# Patient Record
Sex: MALE | Race: BLACK OR AFRICAN AMERICAN | NOT HISPANIC OR LATINO | Employment: UNEMPLOYED | URBAN - METROPOLITAN AREA
[De-identification: names, ages, dates, MRNs, and addresses within clinical notes are randomized per-mention and may not be internally consistent; named-entity substitution may affect disease eponyms.]

---

## 2024-04-13 ENCOUNTER — APPOINTMENT (OUTPATIENT)
Dept: RADIOLOGY | Facility: HOSPITAL | Age: 34
End: 2024-04-13
Payer: COMMERCIAL

## 2024-04-13 ENCOUNTER — HOSPITAL ENCOUNTER (EMERGENCY)
Facility: HOSPITAL | Age: 34
Discharge: HOME | End: 2024-04-13
Payer: COMMERCIAL

## 2024-04-13 VITALS
HEART RATE: 96 BPM | RESPIRATION RATE: 18 BRPM | DIASTOLIC BLOOD PRESSURE: 68 MMHG | TEMPERATURE: 98.4 F | SYSTOLIC BLOOD PRESSURE: 113 MMHG | HEIGHT: 71 IN | WEIGHT: 137 LBS | BODY MASS INDEX: 19.18 KG/M2 | OXYGEN SATURATION: 99 %

## 2024-04-13 DIAGNOSIS — S49.92XA INJURY OF LEFT SHOULDER, INITIAL ENCOUNTER: Primary | ICD-10-CM

## 2024-04-13 PROCEDURE — 73030 X-RAY EXAM OF SHOULDER: CPT | Mod: LT

## 2024-04-13 PROCEDURE — 73030 X-RAY EXAM OF SHOULDER: CPT | Mod: LEFT SIDE | Performed by: RADIOLOGY

## 2024-04-13 PROCEDURE — 2500000004 HC RX 250 GENERAL PHARMACY W/ HCPCS (ALT 636 FOR OP/ED): Performed by: NURSE PRACTITIONER

## 2024-04-13 PROCEDURE — 99283 EMERGENCY DEPT VISIT LOW MDM: CPT

## 2024-04-13 PROCEDURE — 96372 THER/PROPH/DIAG INJ SC/IM: CPT | Performed by: NURSE PRACTITIONER

## 2024-04-13 RX ORDER — NAPROXEN 500 MG/1
500 TABLET ORAL
Qty: 20 TABLET | Refills: 0 | Status: SHIPPED | OUTPATIENT
Start: 2024-04-13 | End: 2024-04-23

## 2024-04-13 RX ORDER — TIZANIDINE 2 MG/1
4 TABLET ORAL 3 TIMES DAILY PRN
Qty: 180 TABLET | Refills: 0 | Status: SHIPPED | OUTPATIENT
Start: 2024-04-13 | End: 2024-09-12 | Stop reason: WASHOUT

## 2024-04-13 RX ORDER — HYDROMORPHONE HYDROCHLORIDE 1 MG/ML
0.6 INJECTION, SOLUTION INTRAMUSCULAR; INTRAVENOUS; SUBCUTANEOUS ONCE
Status: COMPLETED | OUTPATIENT
Start: 2024-04-13 | End: 2024-04-13

## 2024-04-13 RX ADMIN — HYDROMORPHONE HYDROCHLORIDE 0.6 MG: 1 INJECTION, SOLUTION INTRAMUSCULAR; INTRAVENOUS; SUBCUTANEOUS at 16:56

## 2024-04-13 ASSESSMENT — PAIN - FUNCTIONAL ASSESSMENT: PAIN_FUNCTIONAL_ASSESSMENT: 0-10

## 2024-04-13 ASSESSMENT — PAIN DESCRIPTION - LOCATION: LOCATION: SHOULDER

## 2024-04-13 ASSESSMENT — COLUMBIA-SUICIDE SEVERITY RATING SCALE - C-SSRS
6. HAVE YOU EVER DONE ANYTHING, STARTED TO DO ANYTHING, OR PREPARED TO DO ANYTHING TO END YOUR LIFE?: NO
1. IN THE PAST MONTH, HAVE YOU WISHED YOU WERE DEAD OR WISHED YOU COULD GO TO SLEEP AND NOT WAKE UP?: NO
2. HAVE YOU ACTUALLY HAD ANY THOUGHTS OF KILLING YOURSELF?: NO

## 2024-04-13 ASSESSMENT — PAIN SCALES - GENERAL: PAINLEVEL_OUTOF10: 10 - WORST POSSIBLE PAIN

## 2024-04-13 ASSESSMENT — PAIN DESCRIPTION - DESCRIPTORS: DESCRIPTORS: ACHING

## 2024-04-13 ASSESSMENT — PAIN DESCRIPTION - ORIENTATION: ORIENTATION: LEFT

## 2024-04-13 NOTE — Clinical Note
Geovanny Kirk was seen and treated in our emergency department on 4/13/2024.  He may return to work on 04/17/2024.       If you have any questions or concerns, please don't hesitate to call.      Edson Taylor, TAYLOR-CNP

## 2024-04-13 NOTE — ED PROVIDER NOTES
HPI   Chief Complaint   Patient presents with    Shoulder Pain       33-year-old male was moving furniture for his child and felt something pop in his left shoulder.  It occurred at 9 AM this morning.  The last time he ate was pancakes at 7 AM.  He denies any prior history of dislocating his shoulder.  He denies any allergies to medication.  He is not on any medication.  He took ibuprofen for the pain.  He rates the pain 10 out of 10.  He denies posterior neck pain.  He denies chest pain or dyspnea.  He denies abdominal pain, nausea, or vomiting.  He denies paresthesia of left upper extremity but endorses limited range of motion and he cannot move his left upper extremity upward more than 10 degrees without severe pain.      History provided by:  Patient and parent   used: No                        [place] Coma Scale Score: 15                     Patient History   No past medical history on file.  No past surgical history on file.  No family history on file.  Social History     Tobacco Use    Smoking status: Every Day     Types: Cigarettes    Smokeless tobacco: Not on file   Substance Use Topics    Alcohol use: Never    Drug use: Not on file       Physical Exam   ED Triage Vitals [04/13/24 1613]   Temperature Heart Rate Respirations BP   36.9 °C (98.4 °F) 96 18 113/68      Pulse Ox Temp src Heart Rate Source Patient Position   99 % -- -- --      BP Location FiO2 (%)     Right arm --       Physical Exam  Constitutional:       Appearance: Normal appearance.   HENT:      Head: Normocephalic and atraumatic.      Nose: Nose normal.      Mouth/Throat:      Mouth: Mucous membranes are moist.   Cardiovascular:      Rate and Rhythm: Normal rate and regular rhythm.   Pulmonary:      Effort: Pulmonary effort is normal.   Abdominal:      General: Abdomen is flat.      Palpations: Abdomen is soft.   Musculoskeletal:         General: Tenderness present.      Cervical back: Normal range of motion.       Comments: Limited range of motion of left upper extremity.  Radial pulse 2/2 and cap refill less than 2 seconds   Skin:     General: Skin is warm.      Capillary Refill: Capillary refill takes less than 2 seconds.   Neurological:      General: No focal deficit present.      Mental Status: He is alert and oriented to person, place, and time.   Psychiatric:         Mood and Affect: Mood normal.         Behavior: Behavior normal.         ED Course & MDM   Diagnoses as of 04/13/24 1808   Injury of left shoulder, initial encounter       Medical Decision Making  Patient responded well to 0.6 mg of Dilaudid and was no longer crying.  A sling was applied for comfort.  X-ray was negative for acute findings.  He may have a rotator cuff injury.  I requested an appointment with orthopedic surgery.  I gave him a note for work until Wednesday.  He received contact information.  He can use Naprosyn and tizanidine for continued pain management and ice would also be helpful.  Cap refill was less than 2 seconds and radial pulse was 2/2.  Safely discharged home with careful return precautions.    Amount and/or Complexity of Data Reviewed  Radiology: ordered and independent interpretation performed.        Procedure  Procedures     Edson Taylor, TAYLOR-CNP  04/13/24 1808

## 2024-04-13 NOTE — ED TRIAGE NOTES
Patient presented to ed for left shoulder pain, patient was moving things in home where he felt a pop. 10/10 for pain.

## 2024-04-17 ENCOUNTER — OFFICE VISIT (OUTPATIENT)
Dept: ORTHOPEDIC SURGERY | Facility: HOSPITAL | Age: 34
End: 2024-04-17
Payer: COMMERCIAL

## 2024-04-17 DIAGNOSIS — M25.812 IMPINGEMENT OF LEFT SHOULDER: ICD-10-CM

## 2024-04-17 PROCEDURE — 2500000004 HC RX 250 GENERAL PHARMACY W/ HCPCS (ALT 636 FOR OP/ED): Performed by: ORTHOPAEDIC SURGERY

## 2024-04-17 PROCEDURE — 99204 OFFICE O/P NEW MOD 45 MIN: CPT | Performed by: ORTHOPAEDIC SURGERY

## 2024-04-17 PROCEDURE — 2500000005 HC RX 250 GENERAL PHARMACY W/O HCPCS: Performed by: ORTHOPAEDIC SURGERY

## 2024-04-17 PROCEDURE — 20610 DRAIN/INJ JOINT/BURSA W/O US: CPT | Performed by: ORTHOPAEDIC SURGERY

## 2024-04-17 PROCEDURE — 99214 OFFICE O/P EST MOD 30 MIN: CPT | Performed by: ORTHOPAEDIC SURGERY

## 2024-04-17 RX ORDER — LIDOCAINE HYDROCHLORIDE 10 MG/ML
4 INJECTION INFILTRATION; PERINEURAL
Status: COMPLETED | OUTPATIENT
Start: 2024-04-17 | End: 2024-04-17

## 2024-04-17 RX ORDER — TRIAMCINOLONE ACETONIDE 40 MG/ML
40 INJECTION, SUSPENSION INTRA-ARTICULAR; INTRAMUSCULAR
Status: COMPLETED | OUTPATIENT
Start: 2024-04-17 | End: 2024-04-17

## 2024-04-17 RX ADMIN — TRIAMCINOLONE ACETONIDE 40 MG: 400 INJECTION, SUSPENSION INTRA-ARTICULAR; INTRAMUSCULAR at 14:28

## 2024-04-17 RX ADMIN — LIDOCAINE HYDROCHLORIDE 4 ML: 10 INJECTION, SOLUTION INFILTRATION; PERINEURAL at 14:28

## 2024-04-17 NOTE — PROGRESS NOTES
Evaluation of left shoulder pain has been going on for several days.  He was seen in the emergency room and given a sling and some medication which did not help.  He had a prodrome of mild pain got much worse after heavy usage.  Pain is into his shoulder and arm and into his neck and the left side.  Sling was not helpful.    The patient is pleasant and cooperative.  The patient is alert and oriented ×3.  Auditory function is intact.  The patient is a fair historian.  The patient is not in acute distress.  Eye exam significant for nonicteric sclera, intact ocular muscle movement.  Breathing is rhythmic symmetric and nonlabored.    Left radial pulse palpable brisk cap refill light touch sensation intact integument significant for tattoos otherwise intact with no lesions lacerations abrasions or contusions the distal clavicle is prominent but nontender.  There is no tenderness about the shoulder but there is pain on elevation above 90 degrees.  Patient to maintain abduction against slight resistance grade 4/5 abduction strength internal/external rotation strength 5/5 there is a click on external rotation with the arm at the side.  No apprehension.  Patient has good range of motion of the cervical spine with minimal pain.    X-rays were obtained and reviewed with the patient the glenohumeral and subacromial spaces are well-preserved no fractures occasional subluxation arthritic degenerative changes noted.    Left shoulder pain    Patient is left shoulder pain most likely from subacromial origin we discussed options for treatment I recommended subacromial injection.  Injection was performed and tolerated well by the patient.  I have also recommended physical therapy and discontinuance of sling.  Prescription for therapy was provided today.  Follow-up will be by telephone next week.    This was dictated using voice recognition software and not corrected for grammatical or spelling errors.  L Inj/Asp: L subacromial bursa  on 4/17/2024 2:28 PM  Indications: pain  Details: 22 G needle, posterior approach  Medications: 40 mg triamcinolone acetonide 40 mg/mL; 4 mL lidocaine 10 mg/mL (1 %)  Outcome: tolerated well, no immediate complications  Procedure, treatment alternatives, risks and benefits explained, specific risks discussed. Consent was given by the patient. Immediately prior to procedure a time out was called to verify the correct patient, procedure, equipment, support staff and site/side marked as required.

## 2024-05-13 ENCOUNTER — OFFICE VISIT (OUTPATIENT)
Dept: ORTHOPEDIC SURGERY | Facility: HOSPITAL | Age: 34
End: 2024-05-13
Payer: COMMERCIAL

## 2024-05-13 ENCOUNTER — APPOINTMENT (OUTPATIENT)
Dept: PHYSICAL THERAPY | Facility: CLINIC | Age: 34
End: 2024-05-13

## 2024-05-13 DIAGNOSIS — M75.102 TEAR OF LEFT ROTATOR CUFF, UNSPECIFIED TEAR EXTENT, UNSPECIFIED WHETHER TRAUMATIC: ICD-10-CM

## 2024-05-13 PROCEDURE — 99212 OFFICE O/P EST SF 10 MIN: CPT | Performed by: ORTHOPAEDIC SURGERY

## 2024-05-13 RX ORDER — MELOXICAM 15 MG/1
15 TABLET ORAL DAILY
Qty: 10 TABLET | Refills: 0 | Status: SHIPPED | OUTPATIENT
Start: 2024-05-13 | End: 2024-05-23

## 2024-05-13 NOTE — PROGRESS NOTES
Mr. Kim is here for reevaluation of his shoulder he had some relief from the injection but it was only transient.  He now has recurrent pain over the deltoid worse with elevation and extension.  He has stopped working because of it.    Left shoulder integument intact with exception of tattoos no asymmetry no atrophy.  The patient has full range of motion but painful arc of motion external rotation and abduction is particularly painful.  He does not have apprehension.  Motor power in abduction is 5/5.    Left shoulder pain    Patient is left shoulder pain recalcitrant to injection and activity modification.  We discussed options for further evaluation I recommend the patient have an MRI and follow-up after it has been done.  This was dictated using voice recognition software and not corrected for grammatical or spelling errors.

## 2024-05-29 ENCOUNTER — HOSPITAL ENCOUNTER (OUTPATIENT)
Dept: RADIOLOGY | Facility: HOSPITAL | Age: 34
Discharge: HOME | End: 2024-05-29
Payer: COMMERCIAL

## 2024-05-29 DIAGNOSIS — M75.102 TEAR OF LEFT ROTATOR CUFF, UNSPECIFIED TEAR EXTENT, UNSPECIFIED WHETHER TRAUMATIC: ICD-10-CM

## 2024-05-29 PROCEDURE — 73221 MRI JOINT UPR EXTREM W/O DYE: CPT | Mod: LT

## 2024-06-02 ENCOUNTER — HOSPITAL ENCOUNTER (EMERGENCY)
Facility: HOSPITAL | Age: 34
Discharge: HOME | End: 2024-06-02

## 2024-06-02 ENCOUNTER — APPOINTMENT (OUTPATIENT)
Dept: CARDIOLOGY | Facility: HOSPITAL | Age: 34
End: 2024-06-02

## 2024-06-02 VITALS
HEART RATE: 75 BPM | HEIGHT: 71 IN | TEMPERATURE: 98 F | DIASTOLIC BLOOD PRESSURE: 84 MMHG | BODY MASS INDEX: 19.18 KG/M2 | WEIGHT: 137 LBS | SYSTOLIC BLOOD PRESSURE: 105 MMHG | OXYGEN SATURATION: 99 % | RESPIRATION RATE: 20 BRPM

## 2024-06-02 DIAGNOSIS — G89.29 CHRONIC LEFT SHOULDER PAIN: Primary | ICD-10-CM

## 2024-06-02 DIAGNOSIS — M25.512 CHRONIC LEFT SHOULDER PAIN: Primary | ICD-10-CM

## 2024-06-02 PROCEDURE — 2500000005 HC RX 250 GENERAL PHARMACY W/O HCPCS

## 2024-06-02 PROCEDURE — 2500000001 HC RX 250 WO HCPCS SELF ADMINISTERED DRUGS (ALT 637 FOR MEDICARE OP)

## 2024-06-02 PROCEDURE — 93005 ELECTROCARDIOGRAM TRACING: CPT

## 2024-06-02 PROCEDURE — 96372 THER/PROPH/DIAG INJ SC/IM: CPT

## 2024-06-02 PROCEDURE — 2500000004 HC RX 250 GENERAL PHARMACY W/ HCPCS (ALT 636 FOR OP/ED)

## 2024-06-02 PROCEDURE — 99283 EMERGENCY DEPT VISIT LOW MDM: CPT

## 2024-06-02 RX ORDER — OXYCODONE AND ACETAMINOPHEN 5; 325 MG/1; MG/1
1 TABLET ORAL ONCE
Status: COMPLETED | OUTPATIENT
Start: 2024-06-02 | End: 2024-06-02

## 2024-06-02 RX ORDER — OXYCODONE AND ACETAMINOPHEN 5; 325 MG/1; MG/1
1 TABLET ORAL EVERY 8 HOURS PRN
Qty: 9 TABLET | Refills: 0 | Status: SHIPPED | OUTPATIENT
Start: 2024-06-02 | End: 2024-06-05

## 2024-06-02 RX ORDER — ORPHENADRINE CITRATE 30 MG/ML
60 INJECTION INTRAMUSCULAR; INTRAVENOUS ONCE
Status: COMPLETED | OUTPATIENT
Start: 2024-06-02 | End: 2024-06-02

## 2024-06-02 RX ORDER — LIDOCAINE 560 MG/1
2 PATCH PERCUTANEOUS; TOPICAL; TRANSDERMAL ONCE
Status: DISCONTINUED | OUTPATIENT
Start: 2024-06-02 | End: 2024-06-02 | Stop reason: HOSPADM

## 2024-06-02 RX ORDER — KETOROLAC TROMETHAMINE 30 MG/ML
30 INJECTION, SOLUTION INTRAMUSCULAR; INTRAVENOUS ONCE
Status: COMPLETED | OUTPATIENT
Start: 2024-06-02 | End: 2024-06-02

## 2024-06-02 RX ORDER — METHOCARBAMOL 500 MG/1
500 TABLET, FILM COATED ORAL 3 TIMES DAILY
Qty: 21 TABLET | Refills: 0 | Status: SHIPPED | OUTPATIENT
Start: 2024-06-02 | End: 2024-09-12 | Stop reason: WASHOUT

## 2024-06-02 RX ADMIN — OXYCODONE HYDROCHLORIDE AND ACETAMINOPHEN 1 TABLET: 5; 325 TABLET ORAL at 09:17

## 2024-06-02 RX ADMIN — ORPHENADRINE CITRATE 60 MG: 60 INJECTION INTRAMUSCULAR; INTRAVENOUS at 09:19

## 2024-06-02 RX ADMIN — LIDOCAINE 4% 2 PATCH: 40 PATCH TOPICAL at 09:18

## 2024-06-02 RX ADMIN — KETOROLAC TROMETHAMINE 30 MG: 30 INJECTION, SOLUTION INTRAMUSCULAR at 09:20

## 2024-06-02 ASSESSMENT — PAIN DESCRIPTION - ORIENTATION
ORIENTATION: LEFT
ORIENTATION: LEFT

## 2024-06-02 ASSESSMENT — COLUMBIA-SUICIDE SEVERITY RATING SCALE - C-SSRS
1. IN THE PAST MONTH, HAVE YOU WISHED YOU WERE DEAD OR WISHED YOU COULD GO TO SLEEP AND NOT WAKE UP?: NO
2. HAVE YOU ACTUALLY HAD ANY THOUGHTS OF KILLING YOURSELF?: NO
6. HAVE YOU EVER DONE ANYTHING, STARTED TO DO ANYTHING, OR PREPARED TO DO ANYTHING TO END YOUR LIFE?: NO

## 2024-06-02 ASSESSMENT — PAIN - FUNCTIONAL ASSESSMENT
PAIN_FUNCTIONAL_ASSESSMENT: 0-10

## 2024-06-02 ASSESSMENT — PAIN DESCRIPTION - PAIN TYPE
TYPE: ACUTE PAIN
TYPE: ACUTE PAIN

## 2024-06-02 ASSESSMENT — PAIN SCALES - GENERAL
PAINLEVEL_OUTOF10: 10 - WORST POSSIBLE PAIN
PAINLEVEL_OUTOF10: 10 - WORST POSSIBLE PAIN
PAINLEVEL_OUTOF10: 6

## 2024-06-02 ASSESSMENT — PAIN DESCRIPTION - LOCATION
LOCATION: SHOULDER
LOCATION: SHOULDER

## 2024-06-02 NOTE — ED TRIAGE NOTES
Patient presents to ED via car with c/o left shoulder pain for past few months after injuring it in April. Has had an MRI for same.     Patient alert and oriented x 4, skin warm, pink, dry, in NAD, resp. easy unlabored.   '

## 2024-06-02 NOTE — ED PROVIDER NOTES
HPI   Chief Complaint   Patient presents with    Shoulder Pain       HPI  HPI: This is 33 y.o. male who presents to the ER complaining of left shoulder pain ongoing for the past 3 months.  Patient states that he previously injured the shoulder, states he had 2 separate injuries in which he was moving objects and felt a pop and pain in the shoulder, and has had persistent pain in the shoulder since.  Pain is located over the superior shoulder into the trapezius, is worse with any range of motion of the shoulder, lifting objects.  Much worse with heavy use of the extremity.  Not worsened with exertion such as walking distances or climbing stairs.  Pain does not radiate into the chest, back, or down the left upper extremity.  He denies any swelling, redness, or warmth of the shoulder or the bilateral upper extremities.  No decreased range of motion of the upper extremities, though has pain with range of motion of the shoulder.  He denies fevers or chills.  No chest pain or shortness of breath, no pleuritic pain.  No cough or hemoptysis.  Patient has been seen for this by orthopedics, he underwent a steroid injection in the shoulder which provided relief for period of time, the pain subsequently returned after a few weeks.  Saw orthopedics again on 5/13, had an MRI done, results as below, shows narrowing capsular edema about the AC joint suggestive of a low-grade AC sprain in the setting of a prior injury, and possible existing distal clavicular osteolysis.  He was prescribed meloxicam and tizanidine.  Has not yet followed up with orthopedics after the MRI.  Does have an appointment scheduled for Wednesday, 3 days from now.  He states that the injury has caused significant difficulty with work, he works as a  and states that he has had to stop work due to the pain.  States that he also has an infant and caring for the infant is difficult due to the pain.  He states the pain is not relieved with meloxicam and  Flexeril.  Is not simply worsened today, states that he is just not getting any relief, is concerned the MRI may have missed a tear, and worried that he may need surgery.  Denies abdominal pain, nausea, or vomiting.  Denies neck pain or stiffness, no back pain.  No headache, no blurry vision.  No dizziness or lightheadedness, no syncope.  No urinary symptoms.  No numbness, tingling, or weakness to the extremities.  No medications taken today for his symptoms.  No other complaints or symptoms voiced.     Left shoulder MRI from 5/29:   IMPRESSION:  Prominent marrow and capsular edema about the acromioclavicular joint suggestive of low-grade AC sprain in the setting of prior injury. Presence of subchondral cystic changes and indistinctness of the subchondral bone plate may indicate pre-existing distal clavicular osteolysis.    ROS:  General: No decreased responsiveness, no fever, chills  Neuro: no numbness or tingling  ENMT: No nosebleed  Eyes: No discharge or redness  Skel: + left shoulder pain, no neck or back pain  Cardiac: No chest pain   Resp: No shortness of breath  GI: No abdominal pain  Skin: no rash or wounds, no erythema, edema or ecchymosis  Heme: no bleeding or petechiae    PMH: ADHD  Social History: no smoker, no EtOH, no drugs  Family History: Noncontributory    Physical Exam:  General: Vital signs stable, Pt is alert, no acute distress  Eyes: Conjunctiva normal, EOMs intact  HENMT: Normocephalic, atraumatic.  Moist mucous membranes.   Resp: Respiratory effort is normal, no retractions, no stridor.   CV: Heart is regular rate and rhythm.   Skin: Multiple tattoos. No evidence of trauma, skin is warm and dry. No rashes, lesions or ulcers.  Skel: full range of motion of upper and lower extremities. No midline tenderness over the cervical thoracic or lumbar spine.  LUE: +tenderness over the superior and lateral shoulder, into the trapezius and deltoid.  Distal clavicle prominent but nontender with no  overlying skin changes, no edema, no evidence of septic joint.  No edema, erythema, or warmth over the shoulder or remainder of the LUE.  Has intact full active range of motion of the shoulders equal bilaterally, but pain with forward flexion, abduction, and external rotation of the left shoulder. 5/5 strength bilaterally with shoulder forward flexion and abduction, elbow flexion and extension, wrist flexion and extension, and finger abduction.  There is no tenderness over the chest wall, cervical or thoracic paraspinal musculature, or midline spine.  No tenderness or edema over the the remainder of the bilateral upper extremities, nontender over the left upper arm, biceps, elbow, forearm, wrist, hand, or fingers. Able to flex and extend all digits without pain. Able to make a fist and wiggle fingers. No wounds, no bleeding or ecchymosis. No erythema or warmth. Neurovascularly intact, cap refill < 2 sec, 2+radial pulses, warm well perfused, sensation intact and equal throughout the BUE.   Neuro: Normal gait, no motor or sensory changes.  Psych: Alert and oriented ×3, judgment is appropriate, normal mood and affect         [place] Coma Scale Score: 15                     Patient History   No past medical history on file.  No past surgical history on file.  No family history on file.  Social History     Tobacco Use    Smoking status: Every Day     Types: Cigarettes    Smokeless tobacco: Not on file   Substance Use Topics    Alcohol use: Never    Drug use: Not on file       Physical Exam   ED Triage Vitals [06/02/24 0859]   Temperature Heart Rate Respirations BP   36.7 °C (98 °F) 81 20 (!) 150/92      Pulse Ox Temp Source Heart Rate Source Patient Position   100 % Oral -- --      BP Location FiO2 (%)     -- --       Physical Exam    ED Course & [facility]   ED Course as of 06/02/24 1101   Sun Jun 02, 2024   5260 09:07 12 lead EKG interpreted by myself and my ED attending reveals sinus rhythm with sinus arrhythmia with a rate  of 66 beats per minute.  IN interval 160ms, QRS duration 90ms, Qtc 369ms. Early repolarization present, no reciprocal ST depressions. T wave inversions in V1. No acute ischemic changes identified.  [EH]      ED Course User Index  [EH] Eli Ham PA-C         Diagnoses as of 06/02/24 1101   Chronic left shoulder pain       Medical Decision Making  ED course / MDM     Summary:  Patient presented with left shoulder pain ongoing for multiple months after injury.  Followed by orthopedics, recently had an MRI which showed marrow and capsular edema over the AC joint likely representing a low-grade AC sprain in the setting of prior injury, and possible existing distal clavicular osteolysis.  Presenting to the ED due to uncontrolled pain.  Not relieved with meloxicam or tizanidine.  Has had to stop working due to the pain.  He has no other symptoms or complaints.  Vital signs are stable, mildly hypertensive in triage at 150/92, vital signs otherwise normal, nontoxic-appearing.  Patient is overall very well-appearing, ambulates unassisted, no acute distress.  Frustrated on initial evaluation due to lack of relief of pain.  On exam, there is some tenderness over the superior and lateral shoulder, into the deltoid and trapezius, there is no edema, no overlying skin changes, no erythema or warmth, has full range of motion of all joints of the BUE though some pain with range of motion of the shoulder, no evidence of tendon or ligament rupture.  The extremity is neurovascular intact.  Physical exam is reassuring, and he has no other associated symptoms, no fevers or chills, no evidence of cellulitis or septic joint on evaluation.  EKG nonischemic, shows some early repolarization.  Has no exertional symptoms, no chest pain, no dyspnea, no other cardiac signs or symptoms, and no history of cardiovascular disease, low suspicion for a intrathoracic cause of his pain such as ACS or MI.  Pain is the same as he has been experiencing  for a prolonged period of time, patient agrees with plan to treat his pain in the ED and reevaluate.  Patient given a dose of Toradol, Norflex, Percocet, and lidocaine patches in the ED.  On reevaluation, pain significantly improved.  Patient feels much better.  Discussed management options in detail with the patient.  We discussed further workup including labs or imaging, which the patient agrees are not indicated at this time.  I have low suspicion for any referred pain, infection, fracture or bony abnormality, I do agree further workup with labs or imaging is not indicated at this time.  Patient is looking for pain relief at home until he gets to his orthopedics appointment 3 days from now.  He is in agreement the plan for discharge with pain control and close orthopedics and PCP follow-up.  Prescription sent for lidocaine patches, Robaxin, and a short course of Percocet.  I expressed the importance of outpatient follow-up with orthopedics soon as possible, as well as his PCP.  He does also have physical therapy appointments scheduled in the near future.  Repeat vitals improved, BP normalized to 105/84.  Patient is extremely well-appearing, no evidence of infection, systemic illness, or organ dysfunction.  Stable for discharge with close outpatient PCP and orthopedics follow-up. Patient was given strict return precautions, understands reasons to return to the ED. Also discussed supportive care instructions. I expressed the importance of outpatient follow up with their PCP. All questions were answered, patient expressed understanding and stated that they would comply.    Impression:  1. See diagnosis    Plan: Homegoing. I discussed the differential, results, and discharge plan with the patient and family/friend/caregiver. Patient was advised to follow up with PCP or recommended provider in 2-3 days for another evaluation and exam. I emphasized the importance of follow-up with the physician I referred them to in  the timeframe recommended.  I explained reasons for the patient to return to the Emergency Department. They agreed that if they feel their condition is worsening,  if symptoms change, get worse, new symptoms develop prior to follow up, or if they have any other concern they should call 911 immediately for further assistance. If there is no improvement in symptoms in the next 24 hours they are advised to return for further evaluation and exam. I also explained the plan and treatment course. We also discussed medications that were prescribed including common side effects and interactions. The patient was advised to abstain from driving, operating heavy machinery, or making significant decisions while taking medications such as opiates and muscle relaxers that may impair this. I gave the patient an opportunity to ask all questions they had and answered all of them accordingly. They understand return precautions and discharge instructions. Patient and family/friend/caregiver/guardian is in agreement with plan, treatment course, and follow up and states verbally that they will comply.     Disposition: Discharge    This note has been transcribed using voice recognition and may contain grammatical errors, misplaced words, incorrect words, incorrect phrases or other errors.   Procedure  Procedures     Eli Ham PA-C  06/02/24 1104

## 2024-06-03 LAB
ATRIAL RATE: 66 BPM
P AXIS: 72 DEGREES
P OFFSET: 191 MS
P ONSET: 139 MS
PR INTERVAL: 160 MS
Q ONSET: 219 MS
QRS COUNT: 11 BEATS
QRS DURATION: 90 MS
QT INTERVAL: 352 MS
QTC CALCULATION(BAZETT): 369 MS
QTC FREDERICIA: 363 MS
R AXIS: 66 DEGREES
T AXIS: 59 DEGREES
T OFFSET: 395 MS
VENTRICULAR RATE: 66 BPM

## 2024-06-04 ENCOUNTER — APPOINTMENT (OUTPATIENT)
Dept: PRIMARY CARE | Facility: CLINIC | Age: 34
End: 2024-06-04
Payer: COMMERCIAL

## 2024-06-05 ENCOUNTER — OFFICE VISIT (OUTPATIENT)
Dept: ORTHOPEDIC SURGERY | Facility: HOSPITAL | Age: 34
End: 2024-06-05
Payer: MEDICAID

## 2024-06-05 DIAGNOSIS — M89.512 OSTEOLYSIS OF ACROMIAL END OF LEFT CLAVICLE: Primary | ICD-10-CM

## 2024-06-05 PROCEDURE — L3670 SO ACRO/CLAV CAN WEB PRE OTS: HCPCS | Performed by: ORTHOPAEDIC SURGERY

## 2024-06-05 PROCEDURE — 99213 OFFICE O/P EST LOW 20 MIN: CPT | Performed by: ORTHOPAEDIC SURGERY

## 2024-06-05 NOTE — PROGRESS NOTES
Patient is here for reevaluation of his left shoulder and review of his MRI.    Patient has pain on palpation of the acromioclavicular joint pain on cross body motion and pain at the extremes of range of motion with circumduction.  No apprehension.  Full motor power in abduction internal/external rotation.    MRI scan shows distal clavicle osteolysis.  Rotator cuff is intact glenohumeral articular cartilage intact.    Distal clavicle osteolysis left shoulder    Patient is failed a trial of none surgical treatment and I have recommended surgery    We discussed options for treatment of this and I have recommended the patient consider surgery the procedure of the left shoulder distal clavicle resection.  n.  The patient is interested in pursuing this sometime in the near future.  We discussed the potential rehabilitation sequence possible risks alternatives and the patient will schedule surgery at his convenience.    This was dictated using voice recognition software and not corrected for grammatical or spelling errors.

## 2024-06-06 ENCOUNTER — PREP FOR PROCEDURE (OUTPATIENT)
Dept: ORTHOPEDIC SURGERY | Facility: HOSPITAL | Age: 34
End: 2024-06-06
Payer: MEDICAID

## 2024-06-06 DIAGNOSIS — M89.512 OSTEOLYSIS OF ACROMIAL END OF LEFT CLAVICLE: ICD-10-CM

## 2024-06-11 ENCOUNTER — APPOINTMENT (OUTPATIENT)
Dept: PHYSICAL THERAPY | Facility: CLINIC | Age: 34
End: 2024-06-11

## 2024-06-11 ENCOUNTER — CLINICAL SUPPORT (OUTPATIENT)
Dept: PREADMISSION TESTING | Facility: HOSPITAL | Age: 34
End: 2024-06-11
Payer: COMMERCIAL

## 2024-06-11 DIAGNOSIS — M89.512 OSTEOLYSIS OF ACROMIAL END OF LEFT CLAVICLE: ICD-10-CM

## 2024-06-11 RX ORDER — DEXTROMETHORPHAN HYDROBROMIDE, GUAIFENESIN 5; 100 MG/5ML; MG/5ML
650 LIQUID ORAL EVERY 8 HOURS PRN
COMMUNITY

## 2024-06-18 ENCOUNTER — LAB (OUTPATIENT)
Dept: LAB | Facility: LAB | Age: 34
End: 2024-06-18
Payer: COMMERCIAL

## 2024-06-18 ENCOUNTER — PRE-ADMISSION TESTING (OUTPATIENT)
Dept: PREADMISSION TESTING | Facility: HOSPITAL | Age: 34
End: 2024-06-18
Payer: COMMERCIAL

## 2024-06-18 VITALS
WEIGHT: 131.61 LBS | SYSTOLIC BLOOD PRESSURE: 110 MMHG | HEIGHT: 69 IN | DIASTOLIC BLOOD PRESSURE: 67 MMHG | HEART RATE: 69 BPM | TEMPERATURE: 98.8 F | RESPIRATION RATE: 18 BRPM | OXYGEN SATURATION: 99 % | BODY MASS INDEX: 19.49 KG/M2

## 2024-06-18 DIAGNOSIS — Z01.818 PREPROCEDURAL EXAMINATION: ICD-10-CM

## 2024-06-18 DIAGNOSIS — Z01.818 PREPROCEDURAL EXAMINATION: Primary | ICD-10-CM

## 2024-06-18 LAB
ANION GAP SERPL CALC-SCNC: 11 MMOL/L (ref 10–20)
BASOPHILS # BLD AUTO: 0.06 X10*3/UL (ref 0–0.1)
BASOPHILS NFR BLD AUTO: 0.7 %
BUN SERPL-MCNC: 10 MG/DL (ref 6–23)
CALCIUM SERPL-MCNC: 9.5 MG/DL (ref 8.6–10.3)
CHLORIDE SERPL-SCNC: 101 MMOL/L (ref 98–107)
CO2 SERPL-SCNC: 30 MMOL/L (ref 21–32)
CREAT SERPL-MCNC: 0.79 MG/DL (ref 0.5–1.3)
EGFRCR SERPLBLD CKD-EPI 2021: >90 ML/MIN/1.73M*2
EOSINOPHIL # BLD AUTO: 0.23 X10*3/UL (ref 0–0.7)
EOSINOPHIL NFR BLD AUTO: 2.5 %
ERYTHROCYTE [DISTWIDTH] IN BLOOD BY AUTOMATED COUNT: 15.1 % (ref 11.5–14.5)
GLUCOSE SERPL-MCNC: 93 MG/DL (ref 74–99)
HCT VFR BLD AUTO: 42.6 % (ref 41–52)
HGB BLD-MCNC: 14.1 G/DL (ref 13.5–17.5)
IMM GRANULOCYTES # BLD AUTO: 0.02 X10*3/UL (ref 0–0.7)
IMM GRANULOCYTES NFR BLD AUTO: 0.2 % (ref 0–0.9)
LYMPHOCYTES # BLD AUTO: 2.4 X10*3/UL (ref 1.2–4.8)
LYMPHOCYTES NFR BLD AUTO: 26.1 %
MCH RBC QN AUTO: 28.8 PG (ref 26–34)
MCHC RBC AUTO-ENTMCNC: 33.1 G/DL (ref 32–36)
MCV RBC AUTO: 87 FL (ref 80–100)
MONOCYTES # BLD AUTO: 0.59 X10*3/UL (ref 0.1–1)
MONOCYTES NFR BLD AUTO: 6.4 %
NEUTROPHILS # BLD AUTO: 5.89 X10*3/UL (ref 1.2–7.7)
NEUTROPHILS NFR BLD AUTO: 64.1 %
NRBC BLD-RTO: 0 /100 WBCS (ref 0–0)
PLATELET # BLD AUTO: 541 X10*3/UL (ref 150–450)
POTASSIUM SERPL-SCNC: 4.4 MMOL/L (ref 3.5–5.3)
RBC # BLD AUTO: 4.9 X10*6/UL (ref 4.5–5.9)
SODIUM SERPL-SCNC: 138 MMOL/L (ref 136–145)
WBC # BLD AUTO: 9.2 X10*3/UL (ref 4.4–11.3)

## 2024-06-18 PROCEDURE — 80048 BASIC METABOLIC PNL TOTAL CA: CPT

## 2024-06-18 PROCEDURE — 85025 COMPLETE CBC W/AUTO DIFF WBC: CPT

## 2024-06-18 ASSESSMENT — ENCOUNTER SYMPTOMS
ARTHRALGIAS: 1
RESPIRATORY NEGATIVE: 1
NECK PAIN: 1
CARDIOVASCULAR NEGATIVE: 1

## 2024-06-18 NOTE — PREPROCEDURE INSTRUCTIONS
Medication List            Accurate as of June 18, 2024  2:57 PM. Always use your most recent med list.                acetaminophen 650 mg ER tablet  Commonly known as: Tylenol 8 HOUR  Notes to patient: Take if needed      methocarbamol 500 mg tablet  Commonly known as: Robaxin  Take 1 tablet (500 mg) by mouth 3 times a day for 7 days. As needed for pain  Medication Adjustments for Surgery: Continue until night before surgery     tiZANidine 2 mg tablet  Commonly known as: Zanaflex  Take 2 tablets (4 mg) by mouth 3 times a day as needed for muscle spasms.  Medication Adjustments for Surgery: Continue until night before surgery                 CONTACT SURGEON'S OFFICE IF YOU DEVELOP:  * Fever = 100.4 F   * New respiratory symptoms (e.g. cough, shortness of breath, respiratory distress, sore throat)  * Recent loss of taste or smell  *Flu like symptoms such as headache, fatigue or gastrointestinal symptoms  * You develop any open sores, shingles, burning or painful urination   AND/OR:  * You no longer wish to have the surgery.  * Any other personal circumstances change that may lead to the need to cancel or defer this surgery.  *You were admitted to any hospital within one week of your planned procedure.    SMOKING:  *Quitting smoking can make a huge difference to your health and recovery from surgery.    *If you need help with quitting, call [phone]-QUIT-NOW.    THE DAY BEFORE SURGERY:  *Do not eat any food after midnight the night before surgery.   to 13.5 ounces of clear liquid until TWO hours before your instructed arrival time to the hospital  DIABETICS:  Please check fasting blood sugar  upon waking up.  If fasting sugar is <80 mg/dl, please drink 100ml/3oz of apple juice no later than 2 hours prior to surgery.      SURGICAL TIME  *You will be contacted between 2 p.m. and 6 p.m. the business day before your surgery with your arrival time.  *If you haven't received a call by 6pm, call [phone].  *Scheduled  surgery times may change and you will be notified if this occurs-check your personal voicemail for any updates.    ON THE MORNING OF SURGERY:  *Wear comfortable, loose fitting clothing.   *Do not use moisturizers, creams, lotions or perfume.  *All jewelry and valuables should be left at home.  *Prosthetic devices such as contact lenses, hearing aids, dentures, eyelash extensions, hairpins and body piercing must be removed before surgery.    BRING WITH YOU:  *Photo ID and insurance card  *Current list of medicines and allergies  *Pacemaker/Defibrillator/Heart stent cards  *CPAP machine and mask  *Slings/splints/crutches  *Copy of your complete Advanced Directive/DHPOA-if applicable  *Neurostimulator implant remote    PARKING AND ARRIVAL:  *Check in at the Main Entrance desk and let them know you are here for surgery.  *You will be directed to the 2nd floor surgical waiting area.    AFTER OUTPATIENT SURGERY:  *A responsible adult MUST accompany you at the time of discharge and stay with you for 24 hours after your surgery.  *You may NOT drive yourself home after surgery.  *You may use a taxi or ride sharing service ([employer], [employer]) to return home ONLY if you are accompanied by a friend or family member.  *Instructions for resuming your medications will be provided by your surgeon.      .

## 2024-06-18 NOTE — CPM/PAT H&P
"CPM/PAT Evaluation       Name: Geovanny Bradley (Geovanny Bradley \"Jamar\")  /Age: 1990/33 y.o.     SURGEON :DR FUENTES  Surgery, Date, and Length:  Left Shoulder Open Distal Clavicle Resection 24  HPI:  This a 33y.o. male who presents for presurgical evaluation for for above mentioned procedure   . Pt has pain in left shoulder for 3 months . He was moving objects and felt a pop and had immediate pain . He presented to the ER. Imaging showed distal clavicular osteolysis .After discussion of the risks and benefits with Dr. Timmons the patient elects to proceed with the planned procedure.       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 access or unexpected hospital admissions.  NO malignant hyperthermia and or pseudo cholinesterase deficiency.    The patient is not  a Rastafari and will accept blood and blood products if medically indicated.   No history of blood transfusions .Type and screen not sent.     Social History  Social History     Substance and Sexual Activity   Drug Use Yes    Types: Marijuana    Comment: gummies daily to help with pain    PT INSTRUCTED TO HOLD GUMMIES X 7 DAYS   Social History     Substance and Sexual Activity   Alcohol Use Never      Social History     Tobacco Use   Smoking Status Every Day    Current packs/day: 0.25    Types: Cigarettes   Smokeless Tobacco Current   Tobacco Comments    5 cigarettes a day          Family History   Problem Relation Name Age of Onset    No Known Problems Mother      No Known Problems Father      No Known Problems Brother         No Known " "Allergies    Prior to Admission medications    Medication Sig Start Date End Date Taking? Authorizing Provider   acetaminophen (Tylenol 8 HOUR) 650 mg ER tablet Take 1 tablet (650 mg) by mouth every 8 hours if needed for mild pain (1 - 3). Do not crush, chew, or split.    Historical Provider, MD   methocarbamol (Robaxin) 500 mg tablet Take 1 tablet (500 mg) by mouth 3 times a day for 7 days. As needed for pain 6/2/24 6/9/24  Eli Ham PA-C   tiZANidine (Zanaflex) 2 mg tablet Take 2 tablets (4 mg) by mouth 3 times a day as needed for muscle spasms. 4/13/24 5/13/24  Edson Taylor, APRN-CNP        PAT ROS:   Constitutional:   Neuro/Psych:   Eyes:   Ears:   Nose:   Mouth:   neg    Throat:   neg    Neck:    neck pain  Cardio:   neg    Respiratory:   neg    Endocrine:   GI:   :   neg    Musculoskeletal:    arthralgias (b/l shoulders)  Hematologic:   neg    Skin:      Physical Exam  Constitutional:       Appearance: Normal appearance.   HENT:      Head: Normocephalic.      Mouth/Throat:      Mouth: Mucous membranes are moist.   Eyes:      Pupils: Pupils are equal, round, and reactive to light.   Cardiovascular:      Rate and Rhythm: Normal rate and regular rhythm.      Pulses: Normal pulses.      Heart sounds: Normal heart sounds.   Pulmonary:      Breath sounds: Normal breath sounds.   Musculoskeletal:         General: Tenderness present.      Cervical back: Normal range of motion.   Neurological:      Mental Status: He is alert and oriented to person, place, and time.   Psychiatric:         Behavior: Behavior normal.          PAT AIRWAY:   Airway:     Mallampati::  II  normal      /67   Pulse 69   Temp 37.1 °C (98.8 °F)   Resp 18   Ht 1.755 m (5' 9.09\")   Wt 59.7 kg (131 lb 9.8 oz)   SpO2 99%   BMI 19.38 kg/m²       Lab Results   Component Value Date    WBC 9.2 06/18/2024    HGB 14.1 06/18/2024    HCT 42.6 06/18/2024    MCV 87 06/18/2024     (H) 06/18/2024     Lab Results   Component Value " Date    GLUCOSE 93 06/18/2024    CALCIUM 9.5 06/18/2024     06/18/2024    K 4.4 06/18/2024    CO2 30 06/18/2024     06/18/2024    BUN 10 06/18/2024    CREATININE 0.79 06/18/2024       ASSESSMENT/PLAN    Patient is a 33year-old  scheduled for Left Shoulder Open Distal Clavicle Resection  with Dr. TIMMONS  on  7/2/24 .  CARDIOVASCULAR:  RCRI score / Risk: The patients score is 0 based on history . Per ACC/AHA guidelines this places him  at  3.9% risk for MACE undergoing a low  risk procedure . The patient has the following risk factors: denies   Functional Capacity: The patients exercise tolerance is  4  METS. This is based on the patient's. Patient denies  active cardiac symptoms or anginal equivalents .      PULMONARY:  The patient has the following factors that place them at increased risk of perioperative pulmonary complications;active smoker /greater than 1 hour procedure.  Postoperatively the patient would benefit from early pulmonary toilet/incentive spirometry q 1-2 hours while awake/pulse oximetry/cautious use of respiratory depressant medications such as opioids/elevate the HOB/oral hygiene.    THROMBOCYTOSIS :  The patient’s platelet count came back at  541.    Upon trending this is higher than the patient’s platelet range.  The patient denied any history of abnormal skin bruising, bleeding, or clotting, as well as redness and tingling of the hands and feet to suggest primary thrombocytosis.  Pt has had rising platelets for several years. Pt does not see a PCP . Dr Timmons informed of increase risk of clots and referral to hematology if requested .    DVT:  CAPRINI SCORE=6  The patient has the following factors that increase his  Risk for thrombus formation ; Virchow's triad , smoker , thrombocytosis, Surgical procedure >1 hrs  procedure .    Recommendations: DVT prophylaxis  per Dr. Timmons  protocol . SCD's, JENNIFER's, and early ambulation are recommended. Heparin or LMWH is recommended for  the very high risk .      Risk assessment complete.  Patient is scheduled for  LOW  surgical risk procedure.  Patient is considered an acceptable  risk to proceed with the planned procedure.      Preoperative medication instructions were provided and reviewed with the patient.  Any additional testing or evaluation was explained to the patient.  Nothing by mouth instructions were discussed and patient's questions were answered prior to conclusion to this encounter.  Patient verbalized understanding of preoperative instructions given in preadmission testing; discharge instructions available in EMR.

## 2024-06-20 ENCOUNTER — TELEPHONE (OUTPATIENT)
Dept: ORTHOPEDIC SURGERY | Facility: HOSPITAL | Age: 34
End: 2024-06-20
Payer: COMMERCIAL

## 2024-06-20 DIAGNOSIS — R79.89 HIGH PLATELET COUNT: ICD-10-CM

## 2024-06-20 DIAGNOSIS — Z76.89 ENCOUNTER TO ESTABLISH CARE: ICD-10-CM

## 2024-06-20 NOTE — TELEPHONE ENCOUNTER
Message received from Dr Timmons that Geovanny's labs came back from PAT abnormal: Platelets 541, rising the last several years.  Patient has need to establish with PCP and hematology.      I called patient, he is aware of PAT lab findings.  Referrals entered for Primary Care and Hematology.  Patient will call and schedule appointments.

## 2024-07-02 ENCOUNTER — HOSPITAL ENCOUNTER (OUTPATIENT)
Facility: HOSPITAL | Age: 34
Setting detail: OUTPATIENT SURGERY
Discharge: HOME | End: 2024-07-02
Attending: ORTHOPAEDIC SURGERY | Admitting: ORTHOPAEDIC SURGERY
Payer: COMMERCIAL

## 2024-07-02 ENCOUNTER — ANESTHESIA EVENT (OUTPATIENT)
Dept: OPERATING ROOM | Facility: HOSPITAL | Age: 34
End: 2024-07-02
Payer: COMMERCIAL

## 2024-07-02 ENCOUNTER — ANESTHESIA (OUTPATIENT)
Dept: OPERATING ROOM | Facility: HOSPITAL | Age: 34
End: 2024-07-02
Payer: COMMERCIAL

## 2024-07-02 VITALS
HEIGHT: 69 IN | SYSTOLIC BLOOD PRESSURE: 111 MMHG | BODY MASS INDEX: 19.49 KG/M2 | DIASTOLIC BLOOD PRESSURE: 67 MMHG | TEMPERATURE: 97 F | WEIGHT: 131.61 LBS | HEART RATE: 72 BPM | OXYGEN SATURATION: 97 % | RESPIRATION RATE: 14 BRPM

## 2024-07-02 DIAGNOSIS — M89.512 OSTEOLYSIS OF ACROMIAL END OF LEFT CLAVICLE: Primary | ICD-10-CM

## 2024-07-02 PROCEDURE — 2720000007 HC OR 272 NO HCPCS: Performed by: ORTHOPAEDIC SURGERY

## 2024-07-02 PROCEDURE — 2500000004 HC RX 250 GENERAL PHARMACY W/ HCPCS (ALT 636 FOR OP/ED): Performed by: ANESTHESIOLOGY

## 2024-07-02 PROCEDURE — 2500000004 HC RX 250 GENERAL PHARMACY W/ HCPCS (ALT 636 FOR OP/ED): Performed by: ANESTHESIOLOGIST ASSISTANT

## 2024-07-02 PROCEDURE — 2500000005 HC RX 250 GENERAL PHARMACY W/O HCPCS: Performed by: ANESTHESIOLOGY

## 2024-07-02 PROCEDURE — 3700000002 HC GENERAL ANESTHESIA TIME - EACH INCREMENTAL 1 MINUTE: Performed by: ORTHOPAEDIC SURGERY

## 2024-07-02 PROCEDURE — 3700000001 HC GENERAL ANESTHESIA TIME - INITIAL BASE CHARGE: Performed by: ORTHOPAEDIC SURGERY

## 2024-07-02 PROCEDURE — 7100000009 HC PHASE TWO TIME - INITIAL BASE CHARGE: Performed by: ORTHOPAEDIC SURGERY

## 2024-07-02 PROCEDURE — 23120 CLAVICULECTOMY PARTIAL: CPT | Performed by: PHYSICIAN ASSISTANT

## 2024-07-02 PROCEDURE — 7100000010 HC PHASE TWO TIME - EACH INCREMENTAL 1 MINUTE: Performed by: ORTHOPAEDIC SURGERY

## 2024-07-02 PROCEDURE — 2500000005 HC RX 250 GENERAL PHARMACY W/O HCPCS: Performed by: ORTHOPAEDIC SURGERY

## 2024-07-02 PROCEDURE — 3600000004 HC OR TIME - INITIAL BASE CHARGE - PROCEDURE LEVEL FOUR: Performed by: ORTHOPAEDIC SURGERY

## 2024-07-02 PROCEDURE — 7100000001 HC RECOVERY ROOM TIME - INITIAL BASE CHARGE: Performed by: ORTHOPAEDIC SURGERY

## 2024-07-02 PROCEDURE — 3600000009 HC OR TIME - EACH INCREMENTAL 1 MINUTE - PROCEDURE LEVEL FOUR: Performed by: ORTHOPAEDIC SURGERY

## 2024-07-02 PROCEDURE — 23120 CLAVICULECTOMY PARTIAL: CPT | Performed by: ORTHOPAEDIC SURGERY

## 2024-07-02 PROCEDURE — 7100000002 HC RECOVERY ROOM TIME - EACH INCREMENTAL 1 MINUTE: Performed by: ORTHOPAEDIC SURGERY

## 2024-07-02 PROCEDURE — 2500000005 HC RX 250 GENERAL PHARMACY W/O HCPCS: Performed by: ANESTHESIOLOGIST ASSISTANT

## 2024-07-02 RX ORDER — OXYCODONE HYDROCHLORIDE 5 MG/1
5 TABLET ORAL EVERY 4 HOURS PRN
Status: DISCONTINUED | OUTPATIENT
Start: 2024-07-02 | End: 2024-07-02 | Stop reason: HOSPADM

## 2024-07-02 RX ORDER — OXYCODONE AND ACETAMINOPHEN 5; 325 MG/1; MG/1
1 TABLET ORAL EVERY 6 HOURS PRN
Qty: 24 TABLET | Refills: 0 | Status: SHIPPED | OUTPATIENT
Start: 2024-07-02 | End: 2024-07-08 | Stop reason: SDUPTHER

## 2024-07-02 RX ORDER — ROCURONIUM BROMIDE 10 MG/ML
INJECTION, SOLUTION INTRAVENOUS AS NEEDED
Status: DISCONTINUED | OUTPATIENT
Start: 2024-07-02 | End: 2024-07-02

## 2024-07-02 RX ORDER — DOCUSATE SODIUM 100 MG/1
100 CAPSULE, LIQUID FILLED ORAL 2 TIMES DAILY
Qty: 14 CAPSULE | Refills: 0 | Status: SHIPPED | OUTPATIENT
Start: 2024-07-02 | End: 2024-09-12 | Stop reason: WASHOUT

## 2024-07-02 RX ORDER — SODIUM CHLORIDE, SODIUM LACTATE, POTASSIUM CHLORIDE, CALCIUM CHLORIDE 600; 310; 30; 20 MG/100ML; MG/100ML; MG/100ML; MG/100ML
100 INJECTION, SOLUTION INTRAVENOUS CONTINUOUS
Status: DISCONTINUED | OUTPATIENT
Start: 2024-07-02 | End: 2024-07-02 | Stop reason: HOSPADM

## 2024-07-02 RX ORDER — LABETALOL HYDROCHLORIDE 5 MG/ML
5 INJECTION, SOLUTION INTRAVENOUS ONCE AS NEEDED
Status: DISCONTINUED | OUTPATIENT
Start: 2024-07-02 | End: 2024-07-02 | Stop reason: HOSPADM

## 2024-07-02 RX ORDER — ALBUTEROL SULFATE 0.83 MG/ML
2.5 SOLUTION RESPIRATORY (INHALATION) ONCE AS NEEDED
Status: DISCONTINUED | OUTPATIENT
Start: 2024-07-02 | End: 2024-07-02 | Stop reason: HOSPADM

## 2024-07-02 RX ORDER — CEFAZOLIN SODIUM 1 G/50ML
1 SOLUTION INTRAVENOUS ONCE
Status: CANCELLED | OUTPATIENT
Start: 2024-07-02 | End: 2024-07-02

## 2024-07-02 RX ORDER — MIDAZOLAM HYDROCHLORIDE 1 MG/ML
INJECTION, SOLUTION INTRAMUSCULAR; INTRAVENOUS AS NEEDED
Status: DISCONTINUED | OUTPATIENT
Start: 2024-07-02 | End: 2024-07-02

## 2024-07-02 RX ORDER — SODIUM CHLORIDE, SODIUM LACTATE, POTASSIUM CHLORIDE, CALCIUM CHLORIDE 600; 310; 30; 20 MG/100ML; MG/100ML; MG/100ML; MG/100ML
100 INJECTION, SOLUTION INTRAVENOUS CONTINUOUS
Status: DISCONTINUED | OUTPATIENT
Start: 2024-07-02 | End: 2024-07-02 | Stop reason: SDUPTHER

## 2024-07-02 RX ORDER — ONDANSETRON HYDROCHLORIDE 2 MG/ML
INJECTION, SOLUTION INTRAVENOUS AS NEEDED
Status: DISCONTINUED | OUTPATIENT
Start: 2024-07-02 | End: 2024-07-02

## 2024-07-02 RX ORDER — SODIUM CHLORIDE 0.9 G/100ML
IRRIGANT IRRIGATION AS NEEDED
Status: DISCONTINUED | OUTPATIENT
Start: 2024-07-02 | End: 2024-07-02 | Stop reason: HOSPADM

## 2024-07-02 RX ORDER — PROPOFOL 10 MG/ML
INJECTION, EMULSION INTRAVENOUS AS NEEDED
Status: DISCONTINUED | OUTPATIENT
Start: 2024-07-02 | End: 2024-07-02

## 2024-07-02 RX ORDER — LIDOCAINE HYDROCHLORIDE 20 MG/ML
INJECTION, SOLUTION INFILTRATION; PERINEURAL AS NEEDED
Status: DISCONTINUED | OUTPATIENT
Start: 2024-07-02 | End: 2024-07-02

## 2024-07-02 RX ORDER — ONDANSETRON HYDROCHLORIDE 2 MG/ML
4 INJECTION, SOLUTION INTRAVENOUS ONCE AS NEEDED
Status: DISCONTINUED | OUTPATIENT
Start: 2024-07-02 | End: 2024-07-02 | Stop reason: HOSPADM

## 2024-07-02 RX ORDER — LIDOCAINE HYDROCHLORIDE 10 MG/ML
0.1 INJECTION, SOLUTION EPIDURAL; INFILTRATION; INTRACAUDAL; PERINEURAL ONCE
Status: DISCONTINUED | OUTPATIENT
Start: 2024-07-02 | End: 2024-07-02 | Stop reason: HOSPADM

## 2024-07-02 RX ORDER — FENTANYL CITRATE 50 UG/ML
INJECTION, SOLUTION INTRAMUSCULAR; INTRAVENOUS AS NEEDED
Status: DISCONTINUED | OUTPATIENT
Start: 2024-07-02 | End: 2024-07-02

## 2024-07-02 RX ORDER — HYDRALAZINE HYDROCHLORIDE 20 MG/ML
5 INJECTION INTRAMUSCULAR; INTRAVENOUS EVERY 30 MIN PRN
Status: DISCONTINUED | OUTPATIENT
Start: 2024-07-02 | End: 2024-07-02 | Stop reason: HOSPADM

## 2024-07-02 RX ORDER — CEFAZOLIN 1 G/1
INJECTION, POWDER, FOR SOLUTION INTRAVENOUS AS NEEDED
Status: DISCONTINUED | OUTPATIENT
Start: 2024-07-02 | End: 2024-07-02

## 2024-07-02 RX ORDER — BUPIVACAINE HCL/EPINEPHRINE 0.5-1:200K
VIAL (ML) INJECTION AS NEEDED
Status: DISCONTINUED | OUTPATIENT
Start: 2024-07-02 | End: 2024-07-02

## 2024-07-02 RX ADMIN — SODIUM CHLORIDE, POTASSIUM CHLORIDE, SODIUM LACTATE AND CALCIUM CHLORIDE 100 ML/HR: 600; 310; 30; 20 INJECTION, SOLUTION INTRAVENOUS at 07:30

## 2024-07-02 RX ADMIN — SODIUM CHLORIDE, POTASSIUM CHLORIDE, SODIUM LACTATE AND CALCIUM CHLORIDE 100 ML/HR: 600; 310; 30; 20 INJECTION, SOLUTION INTRAVENOUS at 09:48

## 2024-07-02 SDOH — HEALTH STABILITY: MENTAL HEALTH: CURRENT SMOKER: 1

## 2024-07-02 ASSESSMENT — PAIN SCALES - GENERAL
PAINLEVEL_OUTOF10: 0 - NO PAIN

## 2024-07-02 ASSESSMENT — PAIN - FUNCTIONAL ASSESSMENT
PAIN_FUNCTIONAL_ASSESSMENT: 0-10

## 2024-07-02 NOTE — ANESTHESIA PROCEDURE NOTES
Peripheral Block    Patient location during procedure: pre-op  Start time: 7/2/2024 8:17 AM  End time: 7/2/2024 8:27 AM  Reason for block: at surgeon's request and post-op pain management  Staffing  Performed: attending   Authorized by: Braydon Dumont MD    Performed by: Braydon Dumont MD  Preanesthetic Checklist  Completed: patient identified, IV checked, site marked, risks and benefits discussed, surgical consent, monitors and equipment checked, pre-op evaluation and timeout performed   Timeout performed at: 7/2/2024 8:15 AM  Peripheral Block  Patient position: sitting  Prep: ChloraPrep  Patient monitoring: continuous pulse ox  Block type: interscalene and superficial cervical  Laterality: left  Injection technique: single-shot  Guidance: ultrasound guided  Local infiltration: lidocaine  Infiltration strength: 1 %  Dose: 1 mL  Needle  Needle gauge: 22 G  Needle length: 5 cm  Needle localization: ultrasound guidance     image stored in chart  Assessment  Injection assessment: negative aspiration for heme, no paresthesia on injection, incremental injection and local visualized surrounding nerve on ultrasound

## 2024-07-02 NOTE — ANESTHESIA PREPROCEDURE EVALUATION
"Patient: Geovanny Bradley \"Jamar\"    Procedure Information       Anesthesia Start Date/Time: 07/02/24 0829    Procedure: Left Shoulder Open Distal Clavicle Resection (Left: Shoulder) - General / Interscalene block    Location: [facility] / [facility]    Surgeons: Zion Timmons MD          34 yo M hx AHDH, THC gummies for pain, tobacco abuse.    Relevant Problems   No relevant active problems       Clinical information reviewed:   Tobacco  Allergies  Meds   Med Hx  Surg Hx   Fam Hx  Soc Hx        NPO Detail:  NPO/Void Status  Carbohydrate Drink Given Prior to Surgery? : N  Date of Last Liquid: 07/01/24  Time of Last Liquid: 2000  Date of Last Solid: 07/01/24  Time of Last Solid: 2000  Last Intake Type: Clear fluids (water)  Time of Last Void: 0700         Physical Exam    Airway  Mallampati: I  TM distance: >3 FB  Neck ROM: full     Cardiovascular   Rate: normal     Dental - normal exam     Pulmonary - normal exam     Abdominal            Anesthesia Plan    History of general anesthesia?: yes  History of complications of general anesthesia?: no    ASA 2     general     The patient is a current smoker.    intravenous induction   Postoperative administration of opioids is intended.  Anesthetic plan and risks discussed with patient.      "

## 2024-07-02 NOTE — DISCHARGE INSTRUCTIONS
Activity:  Keep arm in sling until follow up visit (will be in sling for approx 4 weeks total).  May not return to work/school until follow up visit.  May not drive  until follow up visit or while taking narcotics  No weight bearing with affected arm      Wound care:  Surgical incision Keep surgical dressing clean dry and intact until your next follow-up appointment in 1 week.  If needed, you may reinforce the dressing but do not remove the original dressing until follow-up.  DO NOT shower until follow up

## 2024-07-02 NOTE — POST-PROCEDURE NOTE
0925: Patient arrived to [facility] after procedure with Anesthesia and procedure RN, procedure discussed, plan reviewed, VSS  0930: family updated via text  0940: pt tolerating po intake at this time, no complaints of n/v at this time  1013: family updated via phone call  1016: Pt taken off of monitor and placed into phase II care  1019: Family at bedside  1020: Discharge instructions discussed with patient and family at this time verbalized understanding   1025: pt dressed with assistance  1029: Family sent for vehicle, pt put in for transportation  1030: pt ambulated to restroom with assistance  1031: Transportation bedside  1034: IV removed, pt tolerated well. Catheter intact  1036: Patient Discharged in stable condition via wheelchair to [facility]

## 2024-07-02 NOTE — H&P
History Of Present Illness  Jamar Bradley is a 33 y.o. male presenting with pain associated with distal clavicle osteolysis.     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    Surgical History  He has a past surgical history that includes Tonsillectomy and Tonsillectomy.     Social History  He reports that he has been smoking cigarettes. He uses smokeless tobacco. He reports current drug use. Drug: Marijuana. He reports that he does not drink alcohol.    Family History  Family History   Problem Relation Name Age of Onset    No Known Problems Mother      No Known Problems Father      No Known Problems Brother          Allergies  Patient has no known allergies.    Review of Systems     Physical Exam     Last Recorded Vitals  There were no vitals taken for this visit.    Relevant Results      Scheduled medications    Continuous medications    PRN medications    No results found for this or any previous visit (from the past 24 hour(s)).    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      Zion Timmons MD

## 2024-07-02 NOTE — OP NOTE
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 [M89.512]     Procedures  Left Shoulder Open Distal Clavicle Resection  79432 - [place] CLAVICULECTOMY PARTIAL      Surgeons      * Zion Timmons - Primary    Resident/Fellow/Other Assistant:  Surgeons and Role:     * Maria Elena Persaud, PA-C - HANSEL First Assist    Procedure Summary  Anesthesia: General  ASA: II  Anesthesia Staff: Anesthesiologist: Braydon Dumont MD  C-AA: ODALIS Dove  Estimated Blood Loss: 10 mL  Intra-op Medications:   Administrations occurring from 0830 to 1030 on 24:   Medication Name Total Dose   sodium chloride 0.9 % irrigation solution 1,000 mL              Anesthesia Record               Intraprocedure I/O Totals       None           Specimen: No specimens collected     Staff:   Circulator: Linda  Circulator: Dar Patel Person: Nash         Drains and/or Catheters: * None in log *    Tourniquet Times:         Implants:     Findings: Hypertrophic meniscus and degenerative changes distal clavicle    Indications: Jamar Bradley is an 33 y.o. 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  Patient has persistent pain in his left shoulder despite nonsurgical treatment he has a likely osteolysis of the distal clavicle confirmed by MRI he was counseled about options for treatment including surgical and nonsurgical.  Possible benefits possible risks alternatives and rehabilitation sequence were explained patient did request and schedule surgery.  He provided informed consent on the day of the procedure.    The patient was seen in the preoperative area. The risks, benefits, complications, treatment " options, non-operative alternatives, expected recovery and outcomes were discussed with the patient. The possibilities of reaction to medication, pulmonary aspiration, injury to surrounding structures, bleeding, recurrent infection, the need for additional procedures, failure to diagnose a condition, and creating a complication requiring transfusion or operation were discussed with the patient. The patient concurred with the proposed plan, giving informed consent.  The site of surgery was properly noted/marked if necessary per policy. The patient has been actively warmed in preoperative area. Preoperative antibiotics have been ordered and given within 1 hours of incision. Venous thrombosis prophylaxis have been ordered including bilateral sequential compression devices    Procedure Details: Anesthesia was consulted for postoperative pain management and preoperatively status and interscalene nerve block on the left side the patient was then transferred to the operating room placed supine on the operating table timeout was called the marked site confirmed patient edification confirmed procedure reviewed allergies reviewed and antibiotics administered general endotracheal anesthesia staff successfully patient moved to seated position all bony prominences were carefully padded sequential compression devices were placed on his legs left upper extremity sterilely prepped and draped incision was made in line with Ottoniel's lines of the skin breath through skin and subcutaneous flaps were established the superior deltotrapezial fascia was incised longitudinally and elevated off the distal clavicle distal clavicle was resected using oscillating saw and osteotome the superior deltotrapezial fascia was reclosed using 0 Vicryl suture and then the subcutaneous tissue and skin were closed meticulously followed by application of sterile dressing and a sling.  The patient was awakened transferred to the recovery room there were no  complications.  Please note that PURA Webster served as first assistant as no qualified resident first assistant was available.  Complications:  None; patient tolerated the procedure well.    Disposition: PACU - hemodynamically stable.  Condition: stable         Additional Details: None    Attending Attestation:     Zion Timmons  Phone Number: [phone]

## 2024-07-02 NOTE — ANESTHESIA PROCEDURE NOTES
Airway  Date/Time: 7/2/2024 8:41 AM  Urgency: elective      Staffing  Performed: ODALIS   Authorized by: Braydon Dumont MD    Performed by: ODALIS Dove  Patient location during procedure: OR    Indications and Patient Condition  Indications for airway management: anesthesia  Spontaneous Ventilation: absent  Sedation level: deep  Preoxygenated: yes  Mask difficulty assessment: 1 - vent by mask    Final Airway Details  Final airway type: endotracheal airway      Successful airway: ETT  Cuffed: yes   Successful intubation technique: direct laryngoscopy  Blade: Neema  Blade size: #4  ETT size (mm): 7.5  Cormack-Lehane Classification: grade I - full view of glottis  Placement verified by: chest auscultation   Number of attempts at approach: 1

## 2024-07-02 NOTE — ANESTHESIA POSTPROCEDURE EVALUATION
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 [M89.512])    Surgeons: Zion Timmons MD Responsible Provider: Braydon Dumont MD    Anesthesia Type: general ASA Status: 2            Anesthesia Type: general    Vitals Value Taken Time   /67 07/02/24 1015   Temp 36.1 °C (97 °F) 07/02/24 1015   Pulse 72 07/02/24 1015   Resp 14 07/02/24 1015   SpO2 97 % 07/02/24 1015       Anesthesia Post Evaluation    Patient participation: complete - patient participated  Level of consciousness: awake  Pain management: adequate  Airway patency: patent  Cardiovascular status: acceptable and hemodynamically stable  Respiratory status: acceptable  Hydration status: acceptable  Postoperative Nausea and Vomiting: none        No notable events documented.    "

## 2024-07-03 ENCOUNTER — PATIENT MESSAGE (OUTPATIENT)
Dept: ORTHOPEDIC SURGERY | Facility: HOSPITAL | Age: 34
End: 2024-07-03
Payer: COMMERCIAL

## 2024-07-03 DIAGNOSIS — M89.512 OSTEOLYSIS OF ACROMIAL END OF LEFT CLAVICLE: ICD-10-CM

## 2024-07-03 RX ORDER — HYDROXYZINE PAMOATE 25 MG/1
25 CAPSULE ORAL EVERY 6 HOURS PRN
Qty: 24 CAPSULE | Refills: 0 | Status: SHIPPED | OUTPATIENT
Start: 2024-07-03 | End: 2024-07-10

## 2024-07-03 ASSESSMENT — PAIN SCALES - GENERAL: PAINLEVEL_OUTOF10: 7

## 2024-07-08 DIAGNOSIS — M89.512 OSTEOLYSIS OF ACROMIAL END OF LEFT CLAVICLE: ICD-10-CM

## 2024-07-08 RX ORDER — OXYCODONE AND ACETAMINOPHEN 5; 325 MG/1; MG/1
1 TABLET ORAL EVERY 6 HOURS PRN
Qty: 24 TABLET | Refills: 0 | Status: SHIPPED | OUTPATIENT
Start: 2024-07-08 | End: 2024-07-14

## 2024-07-10 ENCOUNTER — OFFICE VISIT (OUTPATIENT)
Dept: ORTHOPEDIC SURGERY | Facility: HOSPITAL | Age: 34
End: 2024-07-10
Payer: COMMERCIAL

## 2024-07-10 VITALS — BODY MASS INDEX: 19.4 KG/M2 | WEIGHT: 131 LBS | HEIGHT: 69 IN

## 2024-07-10 DIAGNOSIS — M89.512 OSTEOLYSIS OF ACROMIAL END OF LEFT CLAVICLE: Primary | ICD-10-CM

## 2024-07-10 PROCEDURE — 99211 OFF/OP EST MAY X REQ PHY/QHP: CPT | Performed by: PHYSICIAN ASSISTANT

## 2024-07-10 ASSESSMENT — PAIN - FUNCTIONAL ASSESSMENT: PAIN_FUNCTIONAL_ASSESSMENT: 0-10

## 2024-07-10 ASSESSMENT — PAIN SCALES - GENERAL: PAINLEVEL_OUTOF10: 4

## 2024-07-10 NOTE — PROGRESS NOTES
Jamar presents to clinic today for first postoperative visit after left shoulder open distal clavicle resection performed on 7/2/2024.  He had some issues with pain management however feels it is more well-controlled at this time.  He does not have any therapy set up at this time.    Examination: Surgical incision is healing well no surrounding erythema active drainage or sign of active infection no significant swelling.  Light sensation is intact  strength 5/5, palpable distal radial pulse.    Impression: Osteolysis of left clavicle    Plan: He will continue with sling usage for the next 4 to 5 weeks.  We discussed specific restrictions including cross body adduction and range of motion past 90 degrees.  He may shower and get his incision wet.  Will avoid any heavy lifting or overhead activity as well.  He was instructed to call today to set up therapy I have given him a formal therapy referral today.  We have discussed slowly starting to wean off of the pain medication as he tolerates.  He will return to our office in 3 weeks.    Maria Elena Persaud PA-C  Department of Orthopaedic Surgery  [facility]    Dictation performed with the use of voice recognition software. Syntax and grammatical errors may exist.

## 2024-07-16 ENCOUNTER — TELEPHONE (OUTPATIENT)
Dept: ORTHOPEDIC SURGERY | Facility: HOSPITAL | Age: 34
End: 2024-07-16
Payer: COMMERCIAL

## 2024-07-16 NOTE — TELEPHONE ENCOUNTER
Copied from CRM #[id number]. Topic: Information Request - Doctor, Hospital, or Provider  >> Jul 16, 2024 11:07 AM Stephanie COREA wrote:  Had surgery 7/2 ran out of medication woke up in extreme pain, would like a call back to discuss symptoms and a refill. Please reach out [phone]

## 2024-07-18 DIAGNOSIS — M89.512 OSTEOLYSIS OF ACROMIAL END OF LEFT CLAVICLE: ICD-10-CM

## 2024-07-18 RX ORDER — OXYCODONE AND ACETAMINOPHEN 5; 325 MG/1; MG/1
1 TABLET ORAL EVERY 8 HOURS PRN
Qty: 18 TABLET | Refills: 0 | Status: SHIPPED | OUTPATIENT
Start: 2024-07-18 | End: 2024-07-24

## 2024-07-31 ENCOUNTER — OFFICE VISIT (OUTPATIENT)
Dept: ORTHOPEDIC SURGERY | Facility: HOSPITAL | Age: 34
End: 2024-07-31
Payer: COMMERCIAL

## 2024-07-31 DIAGNOSIS — M25.511 ACUTE PAIN OF RIGHT SHOULDER: Primary | ICD-10-CM

## 2024-07-31 PROCEDURE — 99211 OFF/OP EST MAY X REQ PHY/QHP: CPT | Performed by: ORTHOPAEDIC SURGERY

## 2024-07-31 RX ORDER — MELOXICAM 15 MG/1
15 TABLET ORAL DAILY
Qty: 7 TABLET | Refills: 0 | Status: SHIPPED | OUTPATIENT
Start: 2024-07-31 | End: 2024-08-07

## 2024-07-31 NOTE — PROGRESS NOTES
Patient is here for evaluation of his left shoulder he is now almost a month status post distal clavicle resection he is doing quite well with regard to the left he is having some pain in the anterior aspect of his right shoulder.  Exam of the left shoulder today reveals well-healed scar he has full range of motion with minimal pain.  On the right shoulder the patient also has full range of motion with very slight impingement signs with forward flexion internal rotation abduction and external rotation no apprehension motor power abduction internal/external rotation 5/5.  He has a very prominent distal clavicle on the right side but is not tender.    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  Right shoulder pain.  For the left shoulder recommended discontinue the sling and proceeding with physical therapy as scheduled.  Follow-up in about 3 to 4 weeks.  For the right shoulder recommend a brief course of anti-inflammatory medicine prescription for meloxicam provided today.  Follow-up as needed for the right.  This was dictated using voice recognition software and not corrected for grammatical or spelling errors.

## 2024-08-09 ENCOUNTER — EVALUATION (OUTPATIENT)
Dept: PHYSICAL THERAPY | Facility: CLINIC | Age: 34
End: 2024-08-09
Payer: COMMERCIAL

## 2024-08-09 DIAGNOSIS — M89.512 OSTEOLYSIS OF ACROMIAL END OF LEFT CLAVICLE: ICD-10-CM

## 2024-08-09 DIAGNOSIS — M25.512 LEFT SHOULDER PAIN: Primary | ICD-10-CM

## 2024-08-09 PROCEDURE — 97110 THERAPEUTIC EXERCISES: CPT | Mod: GP

## 2024-08-09 PROCEDURE — 97161 PT EVAL LOW COMPLEX 20 MIN: CPT | Mod: GP

## 2024-08-09 ASSESSMENT — ENCOUNTER SYMPTOMS
OCCASIONAL FEELINGS OF UNSTEADINESS: 0
LOSS OF SENSATION IN FEET: 0
DEPRESSION: 0

## 2024-08-09 ASSESSMENT — PAIN SCALES - GENERAL: PAINLEVEL_OUTOF10: 2

## 2024-08-09 ASSESSMENT — PAIN - FUNCTIONAL ASSESSMENT: PAIN_FUNCTIONAL_ASSESSMENT: 0-10

## 2024-08-09 NOTE — PROGRESS NOTES
"Physical Therapy    Physical Therapy Evaluation    Patient Name: Geovanny Bradley \"Jamar\"  MRN: [id number]  Today's Date: 8/9/2024    Time Entry:  Time Calculation  Start Time: 0850  Stop Time: 0930  Time Calculation (min): 40 min  PT Evaluation Time Entry  PT Evaluation (Low) Time Entry: 30  PT Therapeutic Procedures Time Entry  Therapeutic Exercise Time Entry: 10                 Visit #1  Insurance; [employer]  Plan; 8/9/2024 - 11/6/2024  Problem List Items Addressed This Visit             ICD-10-CM       Musculoskeletal and Injuries    Left shoulder pain - Primary M25.512    Relevant Orders    Follow Up In Physical Therapy    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 Patient demo improving close to normal shoulder ROM in all planes. Patient is lacking knowledge of there ex for shoulder conditioning and anxious about right shoulder gradually developing same symptoms that he had in left arm prior to surgery. In standing right inferior scapular border is winging more than left, suggesting need for strengthening of scapular musculature. Patient motivated, demo good form with initial set of exercises and agreeable to 2 follow ups to confirm proper progression. Patient is denying serious pain since surgery and may be ready for return to work in near future, in my opinion.        Plan  Treatment/Interventions: Education/ Instruction, Manual therapy, Therapeutic exercises  PT Plan: Skilled PT  Rehab Potential: Good  Plan of Care Agreement: Patient  Recommendation; 2 follow ups in order to develop completed shoulder strengthening exercises program with necessary teaching on form and prevention of injuries     Current Problem  1. Left shoulder pain  Follow Up In Physical Therapy      2. 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  Referral to Physical " Therapy    Follow Up In Physical Therapy          Subjective   General:  General  Reason for Referral: PT eval and treat; left shoulder/AC joint discomfort  Referred By: Hang Persaud PA-C  Past Medical History Relevant to Rehab: Left shoulder started to hurt in May of 2024, probably from over working my arm (working out) (Had large spur removed surgically on 7-2-2024)  General Comment: Had follow up with ortho (Dr Timmons) recently, and I was told to stop wearing sling at that time (Have some discomfort, moderate ache, only when I sleep)  Precautions: NA  Precautions  STEADI Fall Risk Score (The score of 4 or more indicates an increased risk of falling): 0  Vital Signs: NA     Pain:  Pain Assessment: 0-10  0-10 (Numeric) Pain Score: 2  Pain Type: Acute pain  Pain Location:  (Left shoulder)  Pain Descriptors:  (ache)  Pain Frequency: Intermittent  Pain Onset: Gradual  Clinical Progression: Rapidly improving  Patient's Stated Pain Goal:  (To built strength and be able to 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[place]))  Pain Interventions:  (NA)  Home Living: lives with girlfriend, mother, and your child     Prior Function Per Pt/Caregiver Report: NA       Objective   Posture: WNL  Mild right more than left scapular winging      Range of Motion: Shoulder  L/R flexion 0-150  L/R abduction 0-150  L/R external rotation 0-70  L behind back reach 3 inches bellow right reach   L/R horizontal adduction WNL  Strength: 5-/5 bilateral shoulder flexion, abduction, external/internal rotations, Horizontal adduction 4+/5 bilaterally (more right scapular winging)     Flexibility: NA     Palpation: WNL to touch     Special Tests: NA     Outcome Measures:  Quick Dash = 56.8%  OP EDUCATION:  Outpatient Education  Individual(s) Educated: Patient  Education Provided: Anatomy, Body Mechanics, Home Exercise Program, POC  Diagnosis and Precautions: 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  Risk and Benefits Discussed with  Patient/Caregiver/Other: yes  Patient/Caregiver Demonstrated Understanding: yes  Plan of Care Discussed and Agreed Upon: yes  Patient Response to Education: Patient/Caregiver Verbalized Understanding of Information  Education Comment: HEP    PT intervention;  Prone scapular retractions/bilateral arm abductions  Rows with Green TB, in sets of 20 x 3  Left external/internal rotations with elbow down; 15 reps in 2-3 sets  Forward lifting slightly above shoulder level with green TB resistance; 10 reps x 2-3 sets  Wall slides  Doorway Biceps and Pectoral stretches  Repeated horizontal adduction stretches     Goals:  Active       PT Problem       PT Goal 1       Start:  08/09/24            PT Goal 2       Start:  08/09/24            PT Goal 3       Start:  08/09/24            PT Goal 4       Start:  08/09/24

## 2024-08-14 ENCOUNTER — APPOINTMENT (OUTPATIENT)
Dept: PHYSICAL THERAPY | Facility: CLINIC | Age: 34
End: 2024-08-14
Payer: COMMERCIAL

## 2024-08-15 ENCOUNTER — APPOINTMENT (OUTPATIENT)
Dept: PRIMARY CARE | Facility: CLINIC | Age: 34
End: 2024-08-15
Payer: COMMERCIAL

## 2024-08-28 ENCOUNTER — OFFICE VISIT (OUTPATIENT)
Dept: ORTHOPEDIC SURGERY | Facility: HOSPITAL | Age: 34
End: 2024-08-28
Payer: COMMERCIAL

## 2024-08-28 DIAGNOSIS — M89.512 OSTEOLYSIS OF ACROMIAL END OF LEFT CLAVICLE: Primary | ICD-10-CM

## 2024-08-28 PROCEDURE — 99024 POSTOP FOLLOW-UP VISIT: CPT | Performed by: ORTHOPAEDIC SURGERY

## 2024-08-28 PROCEDURE — 99212 OFFICE O/P EST SF 10 MIN: CPT | Performed by: ORTHOPAEDIC SURGERY

## 2024-08-28 NOTE — LETTER
August 28, 2024     Patient: Geovanny Bradley   YOB: 1990   Date of Visit: 8/28/2024       To Whom It May Concern:    It is my medical opinion that Geovanny Bradley may return to full duty immediately with no restrictions.    If you have any questions or concerns, please don't hesitate to call.         Sincerely,        Zion Timmons MD    CC: No Recipients

## 2024-08-28 NOTE — PROGRESS NOTES
Mr. Bradley is status post resection distal clavicle left shoulder he is doing very well.    Patient has full range of motion of the shoulder including cross body motion with no pain.  Incision has healed well.  Motor power in abduction is 5/5.    Acromioclavicular arthritis/distal clavicle osteolysis.    Patient is doing well status post distal clavicle resection he may pursue activities as tolerated May return to work without restrictions and follow-up will be as needed.    This was dictated using voice recognition software and not corrected for grammatical or spelling errors.

## 2024-09-09 ENCOUNTER — APPOINTMENT (OUTPATIENT)
Dept: CARDIOLOGY | Facility: HOSPITAL | Age: 34
End: 2024-09-09
Payer: COMMERCIAL

## 2024-09-09 ENCOUNTER — HOSPITAL ENCOUNTER (EMERGENCY)
Facility: HOSPITAL | Age: 34
Discharge: HOME | End: 2024-09-09
Attending: EMERGENCY MEDICINE
Payer: COMMERCIAL

## 2024-09-09 VITALS
BODY MASS INDEX: 20.59 KG/M2 | OXYGEN SATURATION: 99 % | HEIGHT: 69 IN | TEMPERATURE: 98 F | HEART RATE: 80 BPM | WEIGHT: 139 LBS | RESPIRATION RATE: 14 BRPM | SYSTOLIC BLOOD PRESSURE: 110 MMHG | DIASTOLIC BLOOD PRESSURE: 76 MMHG

## 2024-09-09 DIAGNOSIS — R10.13 ABDOMINAL PAIN, EPIGASTRIC: Primary | ICD-10-CM

## 2024-09-09 LAB
ALBUMIN SERPL BCP-MCNC: 3.9 G/DL (ref 3.4–5)
ALP SERPL-CCNC: 45 U/L (ref 33–120)
ALT SERPL W P-5'-P-CCNC: 9 U/L (ref 10–52)
ANION GAP SERPL CALC-SCNC: 10 MMOL/L (ref 10–20)
APPEARANCE UR: CLEAR
AST SERPL W P-5'-P-CCNC: 17 U/L (ref 9–39)
BILIRUB SERPL-MCNC: 0.2 MG/DL (ref 0–1.2)
BILIRUB UR STRIP.AUTO-MCNC: NEGATIVE MG/DL
BUN SERPL-MCNC: 13 MG/DL (ref 6–23)
CALCIUM SERPL-MCNC: 8.8 MG/DL (ref 8.6–10.3)
CARDIAC TROPONIN I PNL SERPL HS: 5 NG/L (ref 0–20)
CHLORIDE SERPL-SCNC: 105 MMOL/L (ref 98–107)
CO2 SERPL-SCNC: 29 MMOL/L (ref 21–32)
COLOR UR: COLORLESS
CREAT SERPL-MCNC: 0.68 MG/DL (ref 0.5–1.3)
EGFRCR SERPLBLD CKD-EPI 2021: >90 ML/MIN/1.73M*2
ERYTHROCYTE [DISTWIDTH] IN BLOOD BY AUTOMATED COUNT: 13.5 % (ref 11.5–14.5)
GLUCOSE SERPL-MCNC: 82 MG/DL (ref 74–99)
GLUCOSE UR STRIP.AUTO-MCNC: NORMAL MG/DL
HCT VFR BLD AUTO: 40.1 % (ref 41–52)
HGB BLD-MCNC: 13.5 G/DL (ref 13.5–17.5)
KETONES UR STRIP.AUTO-MCNC: NEGATIVE MG/DL
LEUKOCYTE ESTERASE UR QL STRIP.AUTO: NEGATIVE
LIPASE SERPL-CCNC: 24 U/L (ref 9–82)
MCH RBC QN AUTO: 28.9 PG (ref 26–34)
MCHC RBC AUTO-ENTMCNC: 33.7 G/DL (ref 32–36)
MCV RBC AUTO: 86 FL (ref 80–100)
NITRITE UR QL STRIP.AUTO: NEGATIVE
NRBC BLD-RTO: 0 /100 WBCS (ref 0–0)
PH UR STRIP.AUTO: 7 [PH]
PLATELET # BLD AUTO: 595 X10*3/UL (ref 150–450)
POTASSIUM SERPL-SCNC: 4.9 MMOL/L (ref 3.5–5.3)
PROT SERPL-MCNC: 6 G/DL (ref 6.4–8.2)
PROT UR STRIP.AUTO-MCNC: NEGATIVE MG/DL
RBC # BLD AUTO: 4.67 X10*6/UL (ref 4.5–5.9)
RBC # UR STRIP.AUTO: NEGATIVE /UL
SODIUM SERPL-SCNC: 139 MMOL/L (ref 136–145)
SP GR UR STRIP.AUTO: 1.01
UROBILINOGEN UR STRIP.AUTO-MCNC: NORMAL MG/DL
WBC # BLD AUTO: 8.3 X10*3/UL (ref 4.4–11.3)

## 2024-09-09 PROCEDURE — 85027 COMPLETE CBC AUTOMATED: CPT | Performed by: EMERGENCY MEDICINE

## 2024-09-09 PROCEDURE — 2500000004 HC RX 250 GENERAL PHARMACY W/ HCPCS (ALT 636 FOR OP/ED)

## 2024-09-09 PROCEDURE — 96374 THER/PROPH/DIAG INJ IV PUSH: CPT

## 2024-09-09 PROCEDURE — 93005 ELECTROCARDIOGRAM TRACING: CPT

## 2024-09-09 PROCEDURE — 83690 ASSAY OF LIPASE: CPT

## 2024-09-09 PROCEDURE — 2500000005 HC RX 250 GENERAL PHARMACY W/O HCPCS

## 2024-09-09 PROCEDURE — 84484 ASSAY OF TROPONIN QUANT: CPT

## 2024-09-09 PROCEDURE — 83690 ASSAY OF LIPASE: CPT | Performed by: EMERGENCY MEDICINE

## 2024-09-09 PROCEDURE — 36415 COLL VENOUS BLD VENIPUNCTURE: CPT

## 2024-09-09 PROCEDURE — 99284 EMERGENCY DEPT VISIT MOD MDM: CPT | Mod: 25

## 2024-09-09 PROCEDURE — 80053 COMPREHEN METABOLIC PANEL: CPT | Performed by: EMERGENCY MEDICINE

## 2024-09-09 PROCEDURE — 85027 COMPLETE CBC AUTOMATED: CPT

## 2024-09-09 PROCEDURE — 80053 COMPREHEN METABOLIC PANEL: CPT

## 2024-09-09 PROCEDURE — 84484 ASSAY OF TROPONIN QUANT: CPT | Performed by: EMERGENCY MEDICINE

## 2024-09-09 PROCEDURE — 96361 HYDRATE IV INFUSION ADD-ON: CPT

## 2024-09-09 PROCEDURE — 96375 TX/PRO/DX INJ NEW DRUG ADDON: CPT

## 2024-09-09 PROCEDURE — 81003 URINALYSIS AUTO W/O SCOPE: CPT

## 2024-09-09 RX ORDER — PANTOPRAZOLE SODIUM 40 MG/10ML
40 INJECTION, POWDER, LYOPHILIZED, FOR SOLUTION INTRAVENOUS ONCE
Status: COMPLETED | OUTPATIENT
Start: 2024-09-09 | End: 2024-09-09

## 2024-09-09 RX ORDER — PANTOPRAZOLE SODIUM 20 MG/1
20 TABLET, DELAYED RELEASE ORAL DAILY
Qty: 20 TABLET | Refills: 0 | Status: SHIPPED | OUTPATIENT
Start: 2024-09-09 | End: 2024-09-29

## 2024-09-09 RX ORDER — ONDANSETRON HYDROCHLORIDE 2 MG/ML
4 INJECTION, SOLUTION INTRAVENOUS ONCE
Status: COMPLETED | OUTPATIENT
Start: 2024-09-09 | End: 2024-09-09

## 2024-09-09 RX ORDER — MORPHINE SULFATE 4 MG/ML
4 INJECTION, SOLUTION INTRAMUSCULAR; INTRAVENOUS ONCE
Status: COMPLETED | OUTPATIENT
Start: 2024-09-09 | End: 2024-09-09

## 2024-09-09 RX ADMIN — DIPHENHYDRAMINE HYDROCHLORIDE AND LIDOCAINE HYDROCHLORIDE AND ALUMINUM HYDROXIDE AND MAGNESIUM HYDRO 10 ML: KIT at 09:50

## 2024-09-09 RX ADMIN — SODIUM CHLORIDE 1000 ML: 9 INJECTION, SOLUTION INTRAVENOUS at 09:47

## 2024-09-09 RX ADMIN — ONDANSETRON 4 MG: 2 INJECTION, SOLUTION INTRAMUSCULAR; INTRAVENOUS at 09:50

## 2024-09-09 RX ADMIN — PANTOPRAZOLE SODIUM 40 MG: 40 INJECTION, POWDER, FOR SOLUTION INTRAVENOUS at 09:49

## 2024-09-09 RX ADMIN — MORPHINE SULFATE 4 MG: 4 INJECTION, SOLUTION INTRAMUSCULAR; INTRAVENOUS at 09:49

## 2024-09-09 ASSESSMENT — COLUMBIA-SUICIDE SEVERITY RATING SCALE - C-SSRS
2. HAVE YOU ACTUALLY HAD ANY THOUGHTS OF KILLING YOURSELF?: NO
6. HAVE YOU EVER DONE ANYTHING, STARTED TO DO ANYTHING, OR PREPARED TO DO ANYTHING TO END YOUR LIFE?: NO
1. IN THE PAST MONTH, HAVE YOU WISHED YOU WERE DEAD OR WISHED YOU COULD GO TO SLEEP AND NOT WAKE UP?: NO

## 2024-09-09 ASSESSMENT — PAIN DESCRIPTION - PAIN TYPE: TYPE: ACUTE PAIN

## 2024-09-09 ASSESSMENT — PAIN DESCRIPTION - LOCATION
LOCATION: ABDOMEN
LOCATION: ABDOMEN

## 2024-09-09 ASSESSMENT — PAIN SCALES - GENERAL
PAINLEVEL_OUTOF10: 10 - WORST POSSIBLE PAIN
PAINLEVEL_OUTOF10: 10 - WORST POSSIBLE PAIN

## 2024-09-09 ASSESSMENT — PAIN - FUNCTIONAL ASSESSMENT: PAIN_FUNCTIONAL_ASSESSMENT: 0-10

## 2024-09-09 NOTE — Clinical Note
Geovanny Bradley was seen and treated in our emergency department on 9/9/2024.  He may return to work on 09/12/2024.       If you have any questions or concerns, please don't hesitate to call.      Eli Ham PA-C

## 2024-09-09 NOTE — ED NOTES
Community Resource Name:No primary care physician   Phone Number:   Staff Member:  Pearl Meza    Discussed the following topics on behalf of the patient:  []  Behavioral Health Assistance     []  Case Management  []   Assistance  []  Digital Equity Assistance  []  Dental Health Assistance  []  Education Assistance  []  Employment Assistance  []  Financial Strain Relief Assistance  []  Food Insecurity Assistance  [x]  Healthcare Coverage Assistance  []  Housing Stability Assistance  []  IP Violence Relief Assistance  []  Legal Assistance  []  Physical Activity Assistance  []  Social Connection Assistance  []  Stress Relief Assistance   []  Substance Abuse Assistance  []  Transportation Assistance  []  Utility Assistance  [x]  Other: [insert comment here]  Patient does have a PCP. CHW updated the patient chart.  Next Steps:         LINO Ruiz    .CHW talked to patient and mom, at the bedside, regarding not having a PCP. Patient mom expressed that he has an upcoming appointment with a new primary care physician on September 12 with Dr. Amada Polk and [employer]. CHW updated the patient chart with new physician name added. Patient and mom expressed appreciation.      LINO Ruiz  09/09/24 1018

## 2024-09-09 NOTE — ED TRIAGE NOTES
Pt arrives to ED for mid abd pain. Pt states has had pain the last few days that has been worsening. Pt states he recently had surgery for bone spur on his shoulder and has been taking tylenol and ibuprofen for pain. Pt denies SOB/CP. Pt denies pmhx of abd issues or surgeries.   
No

## 2024-09-09 NOTE — ED PROVIDER NOTES
HPI   Chief Complaint   Patient presents with    Abdominal Pain       HPI  HISTORY OF PRESENT ILLNESS:  34 y.o. male presenting to the ED with complaint of mid to upper abdominal pain for the past 2 days.  He reports burning, aching, gnawing pain in the epigastric periumbilical area, states it feels like hunger pain but is much worse.  Does not radiate into the back or chest.  States it has been constant, somewhat worse with food, but states that he has had a decreased appetite and is not eating as much is normal.  He denies nausea or vomiting.  States he tried to throw up this morning to see if it would help but was unable to. No constipation or diarrhea.  Denies melena or hematochezia.  Denies fevers or chills.  Denies chest pain or shortness of breath.  Denies any urinary symptoms, no dysuria, hematuria, urinary urgency or frequency.  No recent illness.  Denies any prior abdominal surgeries patient did recently undergo shoulder surgery for a bone spur, and states that he has been taking Tylenol and ibuprofen daily for the pain.  Thinks this may be related.  Per chart review, underwent left shoulder osteolysis on 7/2 with orthopedic surgery.  Has otherwise recovered well from surgery with no complaints.  No significant shoulder pain.  Denies bleeding, drainage, pain, swelling, or redness around incision sites.  No other complaints or symptoms voiced.    12 point review of systems was performed and is negative unless otherwise specified in HPI.    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      Past Surgical History:   Procedure Laterality Date    TONSILLECTOMY       Family History   Problem Relation Name Age of Onset    No Known Problems Mother       No Known Problems Father      No Known Problems Brother       Social History     Tobacco Use    Smoking status: Every Day     Current packs/day: 0.25     Types: Cigarettes    Smokeless tobacco: Current    Tobacco comments:     5 cigarettes a day   Vaping Use    Vaping status: Some Days    Substances: Nicotine, Flavoring    Devices: Disposable   Substance Use Topics    Alcohol use: Never    Drug use: Yes     Types: Marijuana     Comment: gummies daily to help with pain       Physical Exam   ED Triage Vitals [09/09/24 0922]   Temperature Heart Rate Respirations BP   36.9 °C (98.5 °F) 72 16 (!) 132/95      Pulse Ox Temp Source Heart Rate Source Patient Position   100 % Temporal Monitor --      BP Location FiO2 (%)     -- --       Physical Exam  Constitutional:       General: He is not in acute distress.     Appearance: He is not ill-appearing or toxic-appearing.   HENT:      Head: Normocephalic and atraumatic.      Nose: No congestion.      Mouth/Throat:      Mouth: Mucous membranes are moist.   Eyes:      General: No scleral icterus.     Extraocular Movements: Extraocular movements intact.      Pupils: Pupils are equal, round, and reactive to light.   Cardiovascular:      Rate and Rhythm: Normal rate and regular rhythm.      Pulses: Normal pulses.   Pulmonary:      Effort: Pulmonary effort is normal. No respiratory distress.   Abdominal:      General: There is no distension.      Palpations: Abdomen is soft. There is no mass or pulsatile mass.      Tenderness: There is abdominal tenderness in the epigastric area. There is no right CVA tenderness, left CVA tenderness, guarding or rebound.   Musculoskeletal:         General: Normal range of motion.      Cervical back: Normal range of motion and neck supple. No rigidity.      Right lower leg: No edema.      Left lower leg: No edema.   Skin:     General: Skin is warm and dry.      Capillary Refill: Capillary refill takes less than 2 seconds.   Neurological:      General:  No focal deficit present.      Mental Status: He is alert and oriented to person, place, and time.      Cranial Nerves: No cranial nerve deficit.      Motor: No weakness.      Gait: Gait normal.   Psychiatric:         Judgment: Judgment normal.     ED Course & MDM   ED Course as of 09/09/24 1224   Mon Sep 09, 2024   1036 10:08 12 lead EKG interpreted by myself and my ED attending reveals sinus rhythm with a rate of 58 beats per minute.  Normal Axis.  Early repolarization tall T waves multiple leads. T wave inversions in V1. No acute ischemic changes identified. No significant change from prior EKG 02 June 2024. [EH]      ED Course User Index  [EH] Eli Ham PA-C         Diagnoses as of 09/09/24 1224   Abdominal pain, epigastric     Medical Decision Making  ED course / MDM     Summary:  Patient presented with a 2-day history of epigastric abdominal pain.  Has not recently taking daily ibuprofen and Tylenol after shoulder surgery.  No other symptoms.  Vital signs stable, patient is overall very well-appearing.  Ambulates unassisted, no acute distress, nontoxic.  There is mild tenderness in the epigastric area on exam, no distention, no guarding or rigidity, no peritoneal signs.  No CVA tenderness.  Lungs clear to auscultation, heart regular rate and rhythm.  IV established, labs drawn.  Labs show no leukocytosis, normal hemoglobin, no significant electrolyte abnormalities, normal kidney and liver function.  Normal lipase.  UA noninfected.  EKG is nonischemic, and troponin is negative, very low suspicion for ACS or MI as a cause of his symptoms.  Patient was given IV fluids, Protonix, morphine, Zofran, and a BMX in the ED, which significantly improved his pain.   Patient case discussed with ED attending Dr. Enciso, who also saw and evaluated the patient. Results and differential were discussed in detail with the patient.  We discussed the option for further workup including imaging such as ultrasound or CT  scan, but as patient's symptoms have improved, and labs are reassuring, patient agrees that imaging is not indicated at this time and is unlikely to .  He has no right upper quadrant tenderness, normal liver enzymes, no leukocytosis, very low suspicion for cholecystitis, no right lower quadrant pain and no leukocytosis, low suspicion for appendicitis.  Normal lipase, very low suspicion for pancreatitis.  In the setting of significant NSAID use recently, symptoms most likely due to gastritis.  Patient is eager for discharge.  Stable for discharge with close outpatient follow-up.  Prescription sent for Maalox and Protonix.  Instructed to follow-up with his PCP, as well as gastroenterology, given referral information today.  Will return to the ED for any new or worsening symptoms. Patient was given strict return precautions, understands reasons to return to the ED. Also discussed supportive care instructions. I expressed the importance of outpatient follow up with their PCP. All questions were answered, patient expressed understanding and stated that they would comply.    Impression:  1. See diagnosis     Disposition: Discharge    Patient seen and discussed with Dr. Enciso    This note has been transcribed using voice recognition and may contain grammatical errors, misplaced words, incorrect words, incorrect phrases or other errors.   Procedure  Procedures     Eli Ham PA-C  09/09/24 [id number]

## 2024-09-12 ENCOUNTER — APPOINTMENT (OUTPATIENT)
Dept: PRIMARY CARE | Facility: CLINIC | Age: 34
End: 2024-09-12
Payer: COMMERCIAL

## 2024-09-12 VITALS
SYSTOLIC BLOOD PRESSURE: 103 MMHG | HEART RATE: 94 BPM | HEIGHT: 69 IN | WEIGHT: 138 LBS | DIASTOLIC BLOOD PRESSURE: 68 MMHG | BODY MASS INDEX: 20.44 KG/M2

## 2024-09-12 DIAGNOSIS — Z23 FLU VACCINE NEED: ICD-10-CM

## 2024-09-12 DIAGNOSIS — F41.8 DEPRESSION WITH ANXIETY: Primary | ICD-10-CM

## 2024-09-12 PROCEDURE — 99204 OFFICE O/P NEW MOD 45 MIN: CPT | Performed by: INTERNAL MEDICINE

## 2024-09-12 PROCEDURE — 90471 IMMUNIZATION ADMIN: CPT | Performed by: INTERNAL MEDICINE

## 2024-09-12 PROCEDURE — 90656 IIV3 VACC NO PRSV 0.5 ML IM: CPT | Performed by: INTERNAL MEDICINE

## 2024-09-12 PROCEDURE — 3008F BODY MASS INDEX DOCD: CPT | Performed by: INTERNAL MEDICINE

## 2024-09-12 RX ORDER — CITALOPRAM 10 MG/1
10 TABLET ORAL
Qty: 90 TABLET | Refills: 1 | Status: SHIPPED | OUTPATIENT
Start: 2024-09-12

## 2024-09-12 RX ORDER — CITALOPRAM 10 MG/1
10 TABLET ORAL
COMMUNITY
Start: 2024-02-19 | End: 2024-09-12 | Stop reason: SDUPTHER

## 2024-09-12 ASSESSMENT — PATIENT HEALTH QUESTIONNAIRE - PHQ9
4. FEELING TIRED OR HAVING LITTLE ENERGY: NEARLY EVERY DAY
9. THOUGHTS THAT YOU WOULD BE BETTER OFF DEAD, OR OF HURTING YOURSELF: NOT AT ALL
2. FEELING DOWN, DEPRESSED OR HOPELESS: NEARLY EVERY DAY
7. TROUBLE CONCENTRATING ON THINGS, SUCH AS READING THE NEWSPAPER OR WATCHING TELEVISION: NOT AT ALL
SUM OF ALL RESPONSES TO PHQ QUESTIONS 1-9: 12
8. MOVING OR SPEAKING SO SLOWLY THAT OTHER PEOPLE COULD HAVE NOTICED. OR THE OPPOSITE, BEING SO FIGETY OR RESTLESS THAT YOU HAVE BEEN MOVING AROUND A LOT MORE THAN USUAL: NOT AT ALL
1. LITTLE INTEREST OR PLEASURE IN DOING THINGS: NEARLY EVERY DAY
10. IF YOU CHECKED OFF ANY PROBLEMS, HOW DIFFICULT HAVE THESE PROBLEMS MADE IT FOR YOU TO DO YOUR WORK, TAKE CARE OF THINGS AT HOME, OR GET ALONG WITH OTHER PEOPLE: VERY DIFFICULT
5. POOR APPETITE OR OVEREATING: NOT AT ALL
3. TROUBLE FALLING OR STAYING ASLEEP OR SLEEPING TOO MUCH: NEARLY EVERY DAY
6. FEELING BAD ABOUT YOURSELF - OR THAT YOU ARE A FAILURE OR HAVE LET YOURSELF OR YOUR FAMILY DOWN: NOT AT ALL
SUM OF ALL RESPONSES TO PHQ9 QUESTIONS 1 AND 2: 6

## 2024-09-12 ASSESSMENT — LIFESTYLE VARIABLES
HOW OFTEN DO YOU HAVE SIX OR MORE DRINKS ON ONE OCCASION: NEVER
AUDIT-C TOTAL SCORE: 0
HOW MANY STANDARD DRINKS CONTAINING ALCOHOL DO YOU HAVE ON A TYPICAL DAY: PATIENT DOES NOT DRINK
SKIP TO QUESTIONS 9-10: 1
HOW OFTEN DO YOU HAVE A DRINK CONTAINING ALCOHOL: NEVER

## 2024-09-12 NOTE — PROGRESS NOTES
Primary care office Note      Name: Geovanny Bradley, Age: 34 y.o., Gender: male, MRN: [id number]   Pharmacy:   [facility]/pharmacy #[id number] - [place], [place] - [address] [address]  [address] [facility] [address]  Phone: [phone] Fax: [phone]     PCP: Amada Polk        Subjective:   Chief Complaint   Patient presents with    Establish Care     Flu shot        Geovanny Bradley is a 34 y.o. male who presented to the clinic today for establish care    HPI:   Geovanny Bradley is a 34 y.o. male  Left shoulder bone spur removal - July 2024 (Dr Timmons)  Healing well  Pt works as a cook for [employer]    Pt states that he is a picky eater and has trouble eating.  He doesn't cook at home.  Pt states that he has a poor appetite. He does not take in enough protein. Would like to look into a meal replacement supplement.       The patient denies headaches, lightheadedness, changes in speech/vision, photophobia, dysphagia, diaphoresis, Chest pain, dyspnea, Abdominal pain, recent falls or trauma, and changes in bowel/urinary habits.     ROS:   12 points review of system is negative excepted as stated above in the HPI.    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   Allergies:   No Known Allergies   Surgical Hx:   Past Surgical History:   Procedure Laterality Date    SHOULDER SURGERY      TONSILLECTOMY        Social HX:   Social History     Tobacco Use    Smoking status: Every Day     Current packs/day: 0.25     Types: Cigarettes     Passive exposure: Past    Smokeless tobacco: Current    Tobacco comments:     5 cigarettes a day   Vaping Use    Vaping status: Some Days    Substances: Nicotine, Flavoring    Devices: Disposable   Substance Use Topics    Alcohol use: Never    Drug use: Yes     Types: Marijuana     Comment: gummies daily to help with pain        MEDS:   Current Outpatient Medications    Medication Instructions    aluminum-magnesium hydroxide 200-200 mg/5 mL suspension 5 mL, oral, Every 6 hours PRN    citalopram (CELEXA) 10 mg, oral, Daily RT    pantoprazole (PROTONIX) 20 mg, oral, Daily, Do not crush, chew, or split.        Objective Data     Objective:   Visit Vitals  /68 (BP Location: Right arm, Patient Position: Sitting, BP Cuff Size: Adult)   Pulse 94        Physical Examination:   GE; sitting comfortably in a chair, in NAD  HEENT; AT/NC, no conjunctival pallor, anicteric sclera  Neck; Supple neck, no LAD/JVD  Chest; CTAbl, no adventitious sounds  CVS; s1 and s2 only no MGR, RRR  Ext; no cyanosis/clubbing/edema, pulses intact  Neuro; Aox3  Psych; normal mood     Last Labs:   CBC:   WBC   Date Value Ref Range Status   09/09/2024 8.3 4.4 - 11.3 x10*3/uL Final   06/18/2024 9.2 4.4 - 11.3 x10*3/uL Final   09/10/2022 9.7 4.4 - 11.3 x10E9/L Final     Hemoglobin   Date Value Ref Range Status   09/09/2024 13.5 13.5 - 17.5 g/dL Final   06/18/2024 14.1 13.5 - 17.5 g/dL Final   09/10/2022 13.9 13.5 - 17.5 g/dL Final     MCV   Date Value Ref Range Status   09/09/2024 86 80 - 100 fL Final   06/18/2024 87 80 - 100 fL Final   09/10/2022 85 80 - 100 fL Final     Platelets   Date Value Ref Range Status   09/09/2024 595 (H) 150 - 450 x10*3/uL Final   06/18/2024 541 (H) 150 - 450 x10*3/uL Final   09/10/2022 525 (H) 150 - 450 x10E9/L Final      CMP:   Sodium   Date Value Ref Range Status   09/09/2024 139 136 - 145 mmol/L Final   06/18/2024 138 136 - 145 mmol/L Final   09/10/2022 137 136 - 145 mmol/L Final     Potassium   Date Value Ref Range Status   09/09/2024 4.9 3.5 - 5.3 mmol/L Final     Comment:     MILD HEMOLYSIS DETECTED. The result may be falsely elevated due to hemolysis or other interferents. Clinical correlation is recommended. Repeat testing may be considered.   06/18/2024 4.4 3.5 - 5.3 mmol/L Final   09/10/2022 3.9 3.5 - 5.3 mmol/L Final     Chloride   Date Value Ref Range Status   09/09/2024  "105 98 - 107 mmol/L Final   06/18/2024 101 98 - 107 mmol/L Final   09/10/2022 102 98 - 107 mmol/L Final     Urea Nitrogen   Date Value Ref Range Status   09/09/2024 13 6 - 23 mg/dL Final   06/18/2024 10 6 - 23 mg/dL Final   09/10/2022 10 6 - 23 mg/dL Final     Creatinine   Date Value Ref Range Status   09/09/2024 0.68 0.50 - 1.30 mg/dL Final   06/18/2024 0.79 0.50 - 1.30 mg/dL Final   09/10/2022 0.75 0.50 - 1.30 mg/dL Final     eGFR   Date Value Ref Range Status   09/09/2024 >90 >60 mL/min/1.73m*2 Final     Comment:     Calculations of estimated GFR are performed using the 2021 CKD-EPI Study Refit equation without the race variable for the IDMS-Traceable creatinine methods.  https://jasn.asnjournals.org/content/early/2021/09/22/ASN.9306526002   06/18/2024 >90 >60 mL/min/1.73m*2 Final     Comment:     Calculations of estimated GFR are performed using the 2021 CKD-EPI Study Refit equation without the race variable for the IDMS-Traceable creatinine methods.  https://jasn.asnjournals.org/content/early/2021/09/22/ASN.6189366512      A1c:   No results found for: \"HGBA1C\"     Other labs;   Vit D:   No results found for: \"VITD25\"   TSH:  No results found for: \"TSH\"     Assessment and Plan     Problem List Items Addressed This Visit       Depression with anxiety - Primary    Relevant Medications    citalopram (CeleXA) 10 mg tablet     Other Visit Diagnoses       Flu vaccine need        Relevant Orders    Flu vaccine, trivalent, preservative free, age 6 months and greater (Fluraix/Fluzone/Flulaval) (Completed)            Amada Polk, DO       "

## 2024-09-14 LAB
ATRIAL RATE: 58 BPM
P AXIS: 52 DEGREES
P OFFSET: 182 MS
P ONSET: 158 MS
PR INTERVAL: 122 MS
Q ONSET: 219 MS
QRS COUNT: 10 BEATS
QRS DURATION: 88 MS
QT INTERVAL: 376 MS
QTC CALCULATION(BAZETT): 369 MS
QTC FREDERICIA: 371 MS
R AXIS: 72 DEGREES
T AXIS: 68 DEGREES
T OFFSET: 407 MS
VENTRICULAR RATE: 58 BPM

## 2024-10-15 ENCOUNTER — APPOINTMENT (OUTPATIENT)
Dept: PRIMARY CARE | Facility: CLINIC | Age: 34
End: 2024-10-15
Payer: COMMERCIAL

## 2024-11-21 ENCOUNTER — DOCUMENTATION (OUTPATIENT)
Dept: PHYSICAL THERAPY | Facility: CLINIC | Age: 34
End: 2024-11-21
Payer: COMMERCIAL

## 2024-11-21 NOTE — PROGRESS NOTES
"Physical Therapy    Discharge Summary    Name: Geovanny Bradley \"Jamar\"  MRN: [id number]  : 1990  Date: 24    Discharge Summary: PT    Discharge Information: Date of discharge 2024, Date of last visit 2024, Date of evaluation 2024, Number of attended visits 1, Referred by Hung Arredondo PA-C, and Referred for shoulder stiffness and weakness s/p surgical spur removal    Therapy Summary: Patient instructed in initial shoulder there ex with recommendation for two follow ups  Patient had no symptoms     Discharge Status: No follow ups set up     Rehab Discharge Reason: Chart is closed due to inactivity   "

## 2025-01-22 ENCOUNTER — APPOINTMENT (OUTPATIENT)
Dept: PRIMARY CARE | Facility: CLINIC | Age: 35
End: 2025-01-22
Payer: COMMERCIAL

## 2025-01-30 LAB
CHOLEST SERPL-MCNC: 132 MG/DL
HBA1C MFR BLD: 5.3 % (ref 4–5.6)
HDLC SERPL-MCNC: 50 MG/DL
LDLC SERPL CALC-MCNC: 68 MG/DL
TRIGL SERPL-MCNC: 69 MG/DL
VLDLC SERPL CALC-MCNC: 14 MG/DL

## 2025-02-04 LAB
DATE LAST DOSE: NORMAL
TME LAST DOSE: NORMAL H
VALPROATE SERPL-MCNC: 51 UG/ML (ref 50–100)
VANCOMYCIN DOSE: NORMAL MG

## 2025-03-06 DIAGNOSIS — G47.00 INSOMNIA, UNSPECIFIED TYPE: ICD-10-CM

## 2025-03-06 DIAGNOSIS — F41.8 DEPRESSION WITH ANXIETY: ICD-10-CM

## 2025-03-06 PROBLEM — S49.90XA SHOULDER INJURY: Status: ACTIVE | Noted: 2025-03-06

## 2025-03-06 RX ORDER — MIRTAZAPINE 15 MG/1
15 TABLET, FILM COATED ORAL NIGHTLY
COMMUNITY
Start: 2025-01-29

## 2025-03-06 RX ORDER — DIVALPROEX SODIUM 500 MG/1
500 TABLET, FILM COATED, EXTENDED RELEASE ORAL NIGHTLY
COMMUNITY
Start: 2025-01-29

## 2025-03-06 RX ORDER — TRAZODONE HYDROCHLORIDE 50 MG/1
50 TABLET ORAL NIGHTLY
Qty: 90 TABLET | Refills: 0 | OUTPATIENT
Start: 2025-03-06

## 2025-03-06 RX ORDER — DIVALPROEX SODIUM 250 MG/1
250 TABLET, FILM COATED, EXTENDED RELEASE ORAL EVERY MORNING
Qty: 90 TABLET | Refills: 0 | OUTPATIENT
Start: 2025-03-06

## 2025-03-06 RX ORDER — HYDROXYZINE PAMOATE 50 MG/1
50 CAPSULE ORAL 3 TIMES DAILY PRN
Qty: 30 CAPSULE | Refills: 0 | Status: CANCELLED | OUTPATIENT
Start: 2025-03-06 | End: 2025-03-16

## 2025-03-06 RX ORDER — MIRTAZAPINE 15 MG/1
15 TABLET, FILM COATED ORAL NIGHTLY
Qty: 30 TABLET | Refills: 0 | Status: CANCELLED | OUTPATIENT
Start: 2025-03-06 | End: 2025-04-05

## 2025-03-06 RX ORDER — MIRTAZAPINE 15 MG/1
15 TABLET, FILM COATED ORAL NIGHTLY
Qty: 90 TABLET | Refills: 0 | OUTPATIENT
Start: 2025-03-06

## 2025-03-06 RX ORDER — DIVALPROEX SODIUM 250 MG/1
250 TABLET, FILM COATED, EXTENDED RELEASE ORAL DAILY
COMMUNITY
Start: 2025-01-29

## 2025-03-06 RX ORDER — HYDROXYZINE HYDROCHLORIDE 50 MG/1
50 TABLET, FILM COATED ORAL NIGHTLY PRN
Qty: 90 TABLET | Refills: 0 | OUTPATIENT
Start: 2025-03-06

## 2025-03-06 RX ORDER — HYDROXYZINE HYDROCHLORIDE 50 MG/1
50 TABLET, FILM COATED ORAL EVERY 6 HOURS PRN
COMMUNITY
Start: 2025-01-29

## 2025-03-06 RX ORDER — DIVALPROEX SODIUM 500 MG/1
500 TABLET, FILM COATED, EXTENDED RELEASE ORAL NIGHTLY
Qty: 90 TABLET | Refills: 0 | OUTPATIENT
Start: 2025-03-06

## 2025-03-06 RX ORDER — TRAZODONE HYDROCHLORIDE 50 MG/1
50 TABLET ORAL NIGHTLY
COMMUNITY
Start: 2025-01-29

## 2025-03-06 RX ORDER — TRAZODONE HYDROCHLORIDE 50 MG/1
50 TABLET ORAL NIGHTLY PRN
Qty: 30 TABLET | Refills: 0 | Status: CANCELLED | OUTPATIENT
Start: 2025-03-06 | End: 2026-03-06

## 2025-03-06 RX ORDER — VALACYCLOVIR HYDROCHLORIDE 1 G/1
1 TABLET, FILM COATED ORAL 2 TIMES DAILY
COMMUNITY
Start: 2024-10-29

## 2025-04-26 ENCOUNTER — HOSPITAL ENCOUNTER (EMERGENCY)
Facility: HOSPITAL | Age: 35
Discharge: HOME | End: 2025-04-26
Payer: COMMERCIAL

## 2025-04-26 VITALS
DIASTOLIC BLOOD PRESSURE: 73 MMHG | BODY MASS INDEX: 19.85 KG/M2 | WEIGHT: 134 LBS | RESPIRATION RATE: 18 BRPM | OXYGEN SATURATION: 99 % | SYSTOLIC BLOOD PRESSURE: 110 MMHG | TEMPERATURE: 98.6 F | HEART RATE: 83 BPM | HEIGHT: 69 IN

## 2025-04-26 DIAGNOSIS — Z11.3 SCREEN FOR STD (SEXUALLY TRANSMITTED DISEASE): Primary | ICD-10-CM

## 2025-04-26 LAB
APPEARANCE UR: CLEAR
BILIRUB UR STRIP.AUTO-MCNC: NEGATIVE MG/DL
COLOR UR: NORMAL
GLUCOSE UR STRIP.AUTO-MCNC: NORMAL MG/DL
KETONES UR STRIP.AUTO-MCNC: NEGATIVE MG/DL
LEUKOCYTE ESTERASE UR QL STRIP.AUTO: NEGATIVE
NITRITE UR QL STRIP.AUTO: NEGATIVE
PH UR STRIP.AUTO: 6 [PH]
PROT UR STRIP.AUTO-MCNC: NEGATIVE MG/DL
RBC # UR STRIP.AUTO: NEGATIVE MG/DL
SP GR UR STRIP.AUTO: 1.02
UROBILINOGEN UR STRIP.AUTO-MCNC: NORMAL MG/DL

## 2025-04-26 PROCEDURE — 99284 EMERGENCY DEPT VISIT MOD MDM: CPT

## 2025-04-26 PROCEDURE — 81003 URINALYSIS AUTO W/O SCOPE: CPT

## 2025-04-26 PROCEDURE — 87661 TRICHOMONAS VAGINALIS AMPLIF: CPT | Mod: AHULAB

## 2025-04-26 PROCEDURE — 2500000004 HC RX 250 GENERAL PHARMACY W/ HCPCS (ALT 636 FOR OP/ED): Mod: JZ

## 2025-04-26 PROCEDURE — 2500000001 HC RX 250 WO HCPCS SELF ADMINISTERED DRUGS (ALT 637 FOR MEDICARE OP)

## 2025-04-26 PROCEDURE — 87491 CHLMYD TRACH DNA AMP PROBE: CPT | Mod: AHULAB

## 2025-04-26 PROCEDURE — 96372 THER/PROPH/DIAG INJ SC/IM: CPT

## 2025-04-26 RX ORDER — DOXYCYCLINE HYCLATE 100 MG
100 TABLET ORAL ONCE
Status: COMPLETED | OUTPATIENT
Start: 2025-04-26 | End: 2025-04-26

## 2025-04-26 RX ORDER — METRONIDAZOLE 500 MG/1
2000 TABLET ORAL ONCE
Status: COMPLETED | OUTPATIENT
Start: 2025-04-26 | End: 2025-04-26

## 2025-04-26 RX ORDER — DOXYCYCLINE 100 MG/1
100 CAPSULE ORAL 2 TIMES DAILY
Qty: 13 CAPSULE | Refills: 0 | Status: SHIPPED | OUTPATIENT
Start: 2025-04-26 | End: 2025-05-03

## 2025-04-26 RX ORDER — CEFTRIAXONE 500 MG/1
500 INJECTION, POWDER, FOR SOLUTION INTRAMUSCULAR; INTRAVENOUS ONCE
Status: COMPLETED | OUTPATIENT
Start: 2025-04-26 | End: 2025-04-26

## 2025-04-26 RX ADMIN — DOXYCYCLINE HYCLATE 100 MG: 100 TABLET, FILM COATED ORAL at 16:19

## 2025-04-26 RX ADMIN — METRONIDAZOLE 2000 MG: 500 TABLET ORAL at 16:19

## 2025-04-26 RX ADMIN — CEFTRIAXONE SODIUM 500 MG: 500 INJECTION, POWDER, FOR SOLUTION INTRAMUSCULAR; INTRAVENOUS at 16:20

## 2025-04-26 ASSESSMENT — PAIN DESCRIPTION - PAIN TYPE: TYPE: ACUTE PAIN

## 2025-04-26 ASSESSMENT — PAIN SCALES - GENERAL: PAINLEVEL_OUTOF10: 3

## 2025-04-26 ASSESSMENT — COLUMBIA-SUICIDE SEVERITY RATING SCALE - C-SSRS
1. IN THE PAST MONTH, HAVE YOU WISHED YOU WERE DEAD OR WISHED YOU COULD GO TO SLEEP AND NOT WAKE UP?: NO
6. HAVE YOU EVER DONE ANYTHING, STARTED TO DO ANYTHING, OR PREPARED TO DO ANYTHING TO END YOUR LIFE?: NO
2. HAVE YOU ACTUALLY HAD ANY THOUGHTS OF KILLING YOURSELF?: NO

## 2025-04-26 ASSESSMENT — PAIN DESCRIPTION - LOCATION: LOCATION: PENIS

## 2025-04-26 ASSESSMENT — PAIN - FUNCTIONAL ASSESSMENT: PAIN_FUNCTIONAL_ASSESSMENT: 0-10

## 2025-04-26 ASSESSMENT — PAIN DESCRIPTION - DESCRIPTORS: DESCRIPTORS: DISCOMFORT

## 2025-04-26 NOTE — ED PROVIDER NOTES
HPI   Chief Complaint   Patient presents with    Exposure to STD     Last Friday he had unprotected sex and is concerned for UTI or  STD. Notes discomfort with urination. No discharge noted.        34-year-old male presents to the ED today with a chief concern of STD check.  Patient reports that he had unprotected intercourse with a female recently.  He sexually active with females.  He denies any ulcers or lesions in the genital area but reports some discomfort with urination.  No penile discharge.  No urinary frequency urgency or hematuria.  No nausea or vomiting or abdominal pain.  Has never had similar symptoms in the past.  He denies any fevers.  He has no other symptoms or concerns at this time.      History provided by:  Patient   used: No            Patient History   Medical History[1]  Surgical History[2]  Family History[3]  Social History[4]    Physical Exam   ED Triage Vitals [04/26/25 1536]   Temperature Heart Rate Respirations BP   37 °C (98.6 °F) 83 18 110/73      Pulse Ox Temp Source Heart Rate Source Patient Position   99 % Temporal -- Sitting      BP Location FiO2 (%)     Left arm --       Physical Exam  Constitutional: Vital signs per nursing notes.  Well developed, well nourished.  No acute distress.    Eyes: conjunctivae and lids normal  ENT: Ears normal externally; face symmetric. voice normal  Neck: neck supple, no meningismus; trachea midline without deviation  Respiratory: normal respiratory effort and excursion; no rales, rhonchi, or wheezes; equal air entry  Cardiovascular: RRR, 2+ pulses extremities   Neurological: normal speech; CN II-XII grossly intact, normal motor and sensory function  GI: no distention, soft, nontender  : Normal external genitalia.  No ulcers or lesions identified.  Nursing staff Liang present for examination.  No tenderness over the epididymis.  Normal testicular lie.  Musculoskeletal: normal gait and station; normal digits and nails; normal to  palpation; normal strength/tone; neurovascular status intact.  Skin: normal to inspection; normal to palpation; no rash      ED Course & MDM   ED Course as of 04/26/25 1650   Sat Apr 26, 2025   1644 Ua shows no UTI [MC]      ED Course User Index  [MC] Tacho Casanova PA-C         Diagnoses as of 04/26/25 1650   Screen for STD (sexually transmitted disease)                 No data recorded     Vel Coma Scale Score: 15 (04/26/25 1538 : Valorie Roldan RN)                           Medical Decision Making  34-year-old male presents to the ED today with a chief concern of STD check.  Vital signs reassuring.  Patient overall appears well and is nontoxic-appearing.  Patient has full range of motion of the neck without meningismus.  Satting well on room air.  Not hypoxic.  Not tachycardic.  Afebrile.  Urinalysis shows no UTI.  He was swabbed for gonorrhea, chlamydia, and trichomonas.  He had no tenderness over the epididymis.  There is normal testicular lie.  Not concern for any epididymitis or testicular torsion at this time.  He was treated in advance for STDs.  He was given IM Rocephin, doxycycline, and Flagyl in the ED.  Will treat outpatient with doxycycline.  Discussed use and possible side effects of this medication.  Abdomen was soft nontender.  Heart regular rate and rhythm and lungs clear to auscultation bilaterally.  Discussed impression and findings with patient he feels comfortable returning home.  We discussed very strict precautions including returning for any new or worsening signs or symptoms.  Patient is in agreement with this plan.  He will follow-up with the PCP within 3 days.    Differential diagnosis: STD, UTI, testicular torsion, epididymitis, kidney stone    Disposition/treatment  1.  See diagnosis    Shared decision-making was used patient feels comfortable returning home     Patient was advised to follow up with recommended provider in 1 day for another evaluation and exam. I advised  patient/guardian to return or go to closest emergency room immediately if symptoms change, get worse, new symptoms develop prior to follow up. If there is no improvement in symptoms in the next 24 hours they are advised to return for further evaluation and exam. I also explained the plan and treatment course. Patient/guardian is in agreement with plan, treatment course, and follow up and states verbally that they will comply.    Patient is homegoing. I discussed the differential; results and discharge plan with the patient and/or family/friend/caregiver if present.  I emphasized the importance of follow-up with the physician I referred them to in the timeframe recommended.  I explained reasons for the patient to return to the Emergency Department. They agreed that if they feel their condition is worsening or if they have any other concern they should call 911 immediately for further assistance. I gave the patient an opportunity to ask all questions they had and answered all of them accordingly. They understand return precautions and discharge instructions. The patient and/or family/friend/caregiver expressed understanding verbally and that they would comply.        This note has been transcribed using voice recognition and may contain grammatical errors, misplaced words, incorrect words, incorrect phrases or other errors.        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    [2]   Past Surgical History:  Procedure Laterality Date    SHOULDER SURGERY      TONSILLECTOMY     [3]   Family History  Problem Relation Name Age of Onset    No Known Problems Mother      No Known Problems Father      No Known Problems Brother     [4]   Social History  Tobacco Use    Smoking status: Every Day     Current packs/day: 0.25     Types: Cigarettes     Passive exposure: Past    Smokeless tobacco: Current     Tobacco comments:     5 cigarettes a day   Vaping Use    Vaping status: Some Days    Substances: Nicotine, Flavoring    Devices: Disposable   Substance Use Topics    Alcohol use: Never    Drug use: Yes     Types: Marijuana     Comment: gummies daily to help with pain        Tacho Casanova PA-C  04/26/25 1931

## 2025-04-27 LAB
C TRACH RRNA SPEC QL NAA+PROBE: NEGATIVE
HOLD SPECIMEN: NORMAL
N GONORRHOEA DNA SPEC QL PROBE+SIG AMP: NEGATIVE
T VAGINALIS RRNA SPEC QL NAA+PROBE: NEGATIVE

## 2025-04-30 ENCOUNTER — APPOINTMENT (OUTPATIENT)
Dept: PRIMARY CARE | Facility: CLINIC | Age: 35
End: 2025-04-30
Payer: COMMERCIAL

## 2025-08-26 ENCOUNTER — APPOINTMENT (OUTPATIENT)
Dept: GASTROENTEROLOGY | Facility: CLINIC | Age: 35
End: 2025-08-26
Payer: COMMERCIAL

## (undated) DEVICE — SUTURE, VICRYL, 0, 27 IN, UR-6, VIOLET

## (undated) DEVICE — BLADE, LONG MEDIUM 25 X 9

## (undated) DEVICE — DRAPE, SHEET, EXTREMITY, W/ARM BOARD COVERS, 87 X 106 X 128 IN, DISPOSABLE, LF, STERILE

## (undated) DEVICE — DRAPE, INCISE, ANTIMICROBIAL, IOBAN 2, LARGE, 17 X 23 IN, DISPOSABLE, STERILE

## (undated) DEVICE — DRESSING, MEPILEX BORDER, POST-OP AG, 4 X 6 IN

## (undated) DEVICE — SUTURE, VICRYL, 3-0, 18 IN, PS2, UNDYED

## (undated) DEVICE — GLOVE, SURGICAL, PROTEXIS PI W/NEU-THERA, 8.0, PF, LF

## (undated) DEVICE — SUTURE, MONOCRYL, 4-0, 18 IN, PS2, UNDYED

## (undated) DEVICE — DRAPE, INSTRUMENT, W/POUCH, STERI DRAPE, 7 X 11 IN, DISPOSABLE, STERILE

## (undated) DEVICE — GLOVE, SURGICAL, PROTEXIS PI MICRO, 7.5, PF, LF

## (undated) DEVICE — SPONGE, LAP, XRAY DECT, 18IN X 18IN, W/MASTER DMT, STERILE

## (undated) DEVICE — PREP TRAY, VAGINAL

## (undated) DEVICE — SOLUTION, IRRIGATION, SODIUM CHLORIDE 0.9%, 1000 ML, POUR BOTTLE

## (undated) DEVICE — GLOVE, SURGICAL, PROTEXIS PI , 6.0, PF, LF